# Patient Record
Sex: FEMALE | Race: WHITE | NOT HISPANIC OR LATINO | Employment: UNEMPLOYED | RURAL
[De-identification: names, ages, dates, MRNs, and addresses within clinical notes are randomized per-mention and may not be internally consistent; named-entity substitution may affect disease eponyms.]

---

## 2019-11-27 ENCOUNTER — OFFICE VISIT (OUTPATIENT)
Dept: FAMILY MEDICINE CLINIC | Facility: CLINIC | Age: 24
End: 2019-11-27

## 2019-11-27 VITALS
HEART RATE: 108 BPM | TEMPERATURE: 99 F | OXYGEN SATURATION: 95 % | HEIGHT: 66 IN | BODY MASS INDEX: 20.93 KG/M2 | RESPIRATION RATE: 20 BRPM | SYSTOLIC BLOOD PRESSURE: 100 MMHG | WEIGHT: 130.2 LBS | DIASTOLIC BLOOD PRESSURE: 72 MMHG

## 2019-11-27 DIAGNOSIS — R30.0 DYSURIA: Primary | ICD-10-CM

## 2019-11-27 DIAGNOSIS — R55 SYNCOPE AND COLLAPSE: ICD-10-CM

## 2019-11-27 DIAGNOSIS — F33.0 DEPRESSION, MAJOR, RECURRENT, MILD (HCC): ICD-10-CM

## 2019-11-27 DIAGNOSIS — F43.0 ACUTE REACTION TO STRESS: ICD-10-CM

## 2019-11-27 PROBLEM — R56.9 SEIZURES: Status: ACTIVE | Noted: 2019-11-27

## 2019-11-27 PROBLEM — J01.90 ACUTE SINUSITIS: Status: ACTIVE | Noted: 2019-11-27

## 2019-11-27 PROBLEM — B00.9 RECURRENT HERPES SIMPLEX: Status: ACTIVE | Noted: 2019-11-27

## 2019-11-27 PROBLEM — J06.9 ACUTE UPPER RESPIRATORY INFECTION, UNSPECIFIED: Status: ACTIVE | Noted: 2019-11-27

## 2019-11-27 PROBLEM — G43.909 MIGRAINE: Status: ACTIVE | Noted: 2019-11-27

## 2019-11-27 PROBLEM — N39.0 URINARY TRACT INFECTION: Status: ACTIVE | Noted: 2019-11-27

## 2019-11-27 LAB
BILIRUB BLD-MCNC: NEGATIVE MG/DL
CLARITY, POC: ABNORMAL
COLOR UR: YELLOW
GLUCOSE UR STRIP-MCNC: NEGATIVE MG/DL
KETONES UR QL: NEGATIVE
LEUKOCYTE EST, POC: NEGATIVE
NITRITE UR-MCNC: NEGATIVE MG/ML
PH UR: 5 [PH] (ref 5–8)
PROT UR STRIP-MCNC: NEGATIVE MG/DL
RBC # UR STRIP: ABNORMAL /UL
SP GR UR: 1.01 (ref 1–1.03)
UROBILINOGEN UR QL: NORMAL

## 2019-11-27 PROCEDURE — 99214 OFFICE O/P EST MOD 30 MIN: CPT | Performed by: FAMILY MEDICINE

## 2019-11-27 RX ORDER — SERTRALINE HYDROCHLORIDE 100 MG/1
TABLET, FILM COATED ORAL DAILY
COMMUNITY
Start: 2019-05-21 | End: 2021-08-06

## 2019-11-27 RX ORDER — CEPHALEXIN 500 MG/1
500 CAPSULE ORAL 3 TIMES DAILY
Qty: 30 CAPSULE | Refills: 0 | Status: SHIPPED | OUTPATIENT
Start: 2019-11-27 | End: 2019-12-07

## 2019-11-27 RX ORDER — DULOXETIN HYDROCHLORIDE 30 MG/1
30 CAPSULE, DELAYED RELEASE ORAL DAILY
Refills: 2 | COMMUNITY
Start: 2019-10-31 | End: 2021-08-06

## 2019-11-27 RX ORDER — MEDROXYPROGESTERONE ACETATE 150 MG/ML
INJECTION, SUSPENSION INTRAMUSCULAR SEE ADMIN INSTRUCTIONS
Refills: 0 | COMMUNITY
Start: 2019-10-28 | End: 2021-08-23

## 2019-11-27 RX ORDER — RIZATRIPTAN BENZOATE 10 MG/1
TABLET ORAL
COMMUNITY
Start: 2019-05-17 | End: 2021-08-06

## 2019-11-27 NOTE — PROGRESS NOTES
Subjective   Shahana Peña is a 24 y.o. female.     Chief Complaint   Patient presents with   • Urinary Tract Infection       Urinary Tract Infection    This is a new problem. The current episode started 1 to 4 weeks ago. The problem has been gradually worsening. The quality of the pain is described as burning. The pain is mild. There has been no fever. Associated symptoms include flank pain, frequency, nausea and urgency. Pertinent negatives include no chills or vomiting.            I personally reviewed and updated the patient's allergies, medications, problem list, and past medical, surgical, social, and family history.     Family History   Problem Relation Age of Onset   • Coronary artery disease Maternal Uncle    • Coronary artery disease Paternal Uncle         6 paternal uncles   • Coronary artery disease Maternal Grandmother    • Coronary artery disease Paternal Grandmother    • Coronary artery disease Paternal Grandfather        Social History     Tobacco Use   • Smoking status: Never Smoker   • Smokeless tobacco: Never Used   Substance Use Topics   • Alcohol use: No   • Drug use: No       History reviewed. No pertinent surgical history.    Patient Active Problem List   Diagnosis   • Acute reaction to stress   • Acute sinusitis   • Acute upper respiratory infection, unspecified   • Allergic rhinitis   • Anxiety   • Depression, major, recurrent, mild (CMS/HCC)   • Gastroesophageal reflux   • Migraine   • Dysuria   • Recurrent herpes simplex   • Seizures (CMS/HCC)   • Syncope and collapse   • Underweight   • Urinary tract infection         Current Outpatient Medications:   •  DULoxetine (CYMBALTA) 30 MG capsule, Take 30 mg by mouth Daily., Disp: , Rfl: 2  •  medroxyPROGESTERone Acetate 150 MG/ML suspension prefilled syringe, See Admin Instructions., Disp: , Rfl: 0  •  cephalexin (KEFLEX) 500 MG capsule, Take 1 capsule by mouth 3 (Three) Times a Day for 10 days., Disp: 30 capsule, Rfl: 0  •  rizatriptan  "(MAXALT) 10 MG tablet, Take  by mouth., Disp: , Rfl:   •  sertraline (ZOLOFT) 100 MG tablet, Take  by mouth Daily., Disp: , Rfl:          Review of Systems   Constitutional: Negative for chills and diaphoresis.   Eyes: Negative for visual disturbance.   Respiratory: Negative for shortness of breath.    Cardiovascular: Negative for chest pain and palpitations.   Gastrointestinal: Positive for nausea. Negative for abdominal pain and vomiting.   Endocrine: Negative for polydipsia and polyphagia.   Genitourinary: Positive for flank pain, frequency and urgency.   Musculoskeletal: Negative for neck stiffness.   Skin: Negative for color change and pallor.   Neurological: Negative for seizures and syncope.   Hematological: Negative for adenopathy.   Psychiatric/Behavioral: Negative for hallucinations and suicidal ideas.       Objective   /72   Pulse 108   Temp 99 °F (37.2 °C)   Resp 20   Ht 167.6 cm (66\")   Wt 59.1 kg (130 lb 3.2 oz)   LMP  (LMP Unknown)   SpO2 95%   BMI 21.01 kg/m²   Wt Readings from Last 3 Encounters:   11/27/19 59.1 kg (130 lb 3.2 oz)   05/17/19 56.4 kg (124 lb 6.4 oz)   02/08/19 53.7 kg (118 lb 6 oz)     Physical Exam   Constitutional: She is oriented to person, place, and time. She appears well-developed and well-nourished.   Cardiovascular: Normal rate, regular rhythm, S1 normal, S2 normal, normal heart sounds, intact distal pulses and normal pulses. Exam reveals no gallop and no friction rub.   No murmur heard.  Pulmonary/Chest: Effort normal and breath sounds normal. No accessory muscle usage or stridor. She has no decreased breath sounds. She has no wheezes. She has no rhonchi. She has no rales.   Abdominal: Soft. Normal appearance, normal aorta and bowel sounds are normal. She exhibits no distension, no pulsatile midline mass and no mass. There is no hepatosplenomegaly. There is no tenderness. There is no rigidity, no rebound, no guarding, no CVA tenderness and negative Simental's " sign. No hernia.   Neurological: She is alert and oriented to person, place, and time. She has normal strength and normal reflexes. She displays no tremor. No cranial nerve deficit or sensory deficit. She exhibits normal muscle tone. She displays no seizure activity. Coordination and gait normal.   Skin: Skin is warm and dry. Turgor is normal. She is not diaphoretic. No pallor.         Assessment/Plan       UTI.  Start antibiotics, culture sent.  Call or return if fever persistent symptoms  Depression.  Clinically improved with increased dose Zoloft.  Multiple stressors.  Remote history of inpatient stay/suicide attempt, improved/not suicidal currently.  Good social support.  Follow-up recheck.  Syncope.  Improved, no further episodes.  She did not do EEG or see neurology.  CT brain EKG blood work normal per ED.  Consider further eval if recurrent symptoms.    Problem List Items Addressed This Visit        Cardiovascular and Mediastinum    Syncope and collapse    Overview     improved today  ddx includes seizure, atypical migraine  ct brain, ekg, bloodwork normal per ed  eeg, neurology referrall sched  Call / return if if recurrant symptoms.            Nervous and Auditory    Dysuria - Primary    Relevant Orders    POC Urinalysis Dipstick, Multipro (Completed)    Urine Culture - Urine, Urine, Clean Catch       Other    Acute reaction to stress    Relevant Medications    DULoxetine (CYMBALTA) 30 MG capsule    sertraline (ZOLOFT) 100 MG tablet    Depression, major, recurrent, mild (CMS/HCC)    Overview     worse, increase zoloft   multiple  stressors  remote h/o inpatient stay / suicide attemt, not suicidal currently  Good social support  Follow up recheck  recommend counselling she declines  Call / return if worsening symptoms.         Relevant Medications    DULoxetine (CYMBALTA) 30 MG capsule    sertraline (ZOLOFT) 100 MG tablet              Expected course, medications, and adverse effects discussed.  Call or  return if worsening or persistent symptoms.

## 2019-11-29 LAB
BACTERIA UR CULT: NORMAL
BACTERIA UR CULT: NORMAL

## 2019-12-02 ENCOUNTER — TELEPHONE (OUTPATIENT)
Dept: FAMILY MEDICINE CLINIC | Facility: CLINIC | Age: 24
End: 2019-12-02

## 2019-12-02 NOTE — TELEPHONE ENCOUNTER
Let her know that her urine culture was negative, that she is not improving lets have her come back in to be reevaluated, could possibly be a kidney stone causing her symptoms, thanks

## 2019-12-02 NOTE — TELEPHONE ENCOUNTER
Regarding: Test Results Question  Contact: [phone]  ----- Message from [facility], Generic sent at 12/2/2019 12:18 PM EST -----    What was the results from my test was it just a kidney infection? So far the meds I was given are not helping at all

## 2020-02-13 ENCOUNTER — OFFICE VISIT (OUTPATIENT)
Dept: FAMILY MEDICINE CLINIC | Facility: CLINIC | Age: 25
End: 2020-02-13

## 2020-02-13 VITALS
HEART RATE: 117 BPM | HEIGHT: 66 IN | OXYGEN SATURATION: 98 % | SYSTOLIC BLOOD PRESSURE: 120 MMHG | TEMPERATURE: 98.3 F | DIASTOLIC BLOOD PRESSURE: 84 MMHG | RESPIRATION RATE: 16 BRPM | WEIGHT: 128.8 LBS | BODY MASS INDEX: 20.7 KG/M2

## 2020-02-13 DIAGNOSIS — J10.1 INFLUENZA B: Primary | ICD-10-CM

## 2020-02-13 DIAGNOSIS — J01.00 ACUTE NON-RECURRENT MAXILLARY SINUSITIS: ICD-10-CM

## 2020-02-13 PROBLEM — J01.90 ACUTE SINUSITIS: Status: RESOLVED | Noted: 2019-11-27 | Resolved: 2020-02-13

## 2020-02-13 LAB
EXPIRATION DATE: ABNORMAL
FLUAV AG NPH QL: NEGATIVE
FLUBV AG NPH QL: POSITIVE
INTERNAL CONTROL: ABNORMAL
Lab: ABNORMAL

## 2020-02-13 PROCEDURE — 87804 INFLUENZA ASSAY W/OPTIC: CPT | Performed by: FAMILY MEDICINE

## 2020-02-13 PROCEDURE — 99213 OFFICE O/P EST LOW 20 MIN: CPT | Performed by: FAMILY MEDICINE

## 2020-02-13 RX ORDER — OSELTAMIVIR PHOSPHATE 75 MG/1
75 CAPSULE ORAL 2 TIMES DAILY
Qty: 10 CAPSULE | Refills: 0 | Status: SHIPPED | OUTPATIENT
Start: 2020-02-13 | End: 2020-02-18

## 2020-02-13 RX ORDER — AZITHROMYCIN 250 MG/1
TABLET, FILM COATED ORAL
Qty: 6 TABLET | Refills: 0 | Status: SHIPPED | OUTPATIENT
Start: 2020-02-13 | End: 2021-07-15

## 2020-02-13 NOTE — PROGRESS NOTES
Subjective   Shahana Peña is a 25 y.o. female.     Chief Complaint   Patient presents with   • URI       URI    This is a new problem. The current episode started in the past 7 days. The problem has been unchanged. The maximum temperature recorded prior to her arrival was 101 - 101.9 F. Associated symptoms include congestion, coughing, ear pain, headaches, nausea and sneezing. Pertinent negatives include no abdominal pain, chest pain, diarrhea, dysuria, joint pain, joint swelling, neck pain, plugged ear sensation, rash, rhinorrhea, swollen glands, vomiting or wheezing. She has tried acetaminophen for the symptoms. The treatment provided mild relief.        The following portions of the patient's history were reviewed and updated as appropriate: allergies, current medications, past family history, past medical history, past social history, past surgical history and problem list.    Allergies:  No Known Allergies    Social History:  Social History     Socioeconomic History   • Marital status: Single     Spouse name: Not on file   • Number of children: Not on file   • Years of education: Not on file   • Highest education level: Not on file   Tobacco Use   • Smoking status: Never Smoker   • Smokeless tobacco: Never Used   Substance and Sexual Activity   • Alcohol use: No   • Drug use: No   • Sexual activity: Defer       Family History:  Family History   Problem Relation Age of Onset   • Coronary artery disease Maternal Uncle    • Coronary artery disease Paternal Uncle         6 paternal uncles   • Coronary artery disease Maternal Grandmother    • Coronary artery disease Paternal Grandmother    • Coronary artery disease Paternal Grandfather        Past Medical History :  Patient Active Problem List   Diagnosis   • Acute reaction to stress   • Acute upper respiratory infection, unspecified   • Allergic rhinitis   • Anxiety   • Depression, major, recurrent, mild (CMS/HCC)   • Gastroesophageal reflux   • Migraine   •  Dysuria   • Recurrent herpes simplex   • Seizures (CMS/HCC)   • Syncope and collapse   • Underweight   • Urinary tract infection       Medication List:  Outpatient Encounter Medications as of 2/13/2020   Medication Sig Dispense Refill   • medroxyPROGESTERone Acetate 150 MG/ML suspension prefilled syringe See Admin Instructions.  0   • azithromycin (ZITHROMAX) 250 MG tablet Take 2 tablets the first day, then 1 tablet daily for 4 days. 6 tablet 0   • DULoxetine (CYMBALTA) 30 MG capsule Take 30 mg by mouth Daily.  2   • rizatriptan (MAXALT) 10 MG tablet Take  by mouth.     • sertraline (ZOLOFT) 100 MG tablet Take  by mouth Daily.     • [DISCONTINUED] oseltamivir (TAMIFLU) 75 MG capsule Take 1 capsule by mouth 2 (Two) Times a Day. 10 capsule 0     No facility-administered encounter medications on file as of 2/13/2020.        Past Surgical History:  History reviewed. No pertinent surgical history.    Review of Systems:  Review of Systems   Constitutional: Negative for activity change and fever.   HENT: Positive for congestion, ear pain and sneezing. Negative for rhinorrhea, sinus pressure, swollen glands and voice change.    Eyes: Negative for visual disturbance.   Respiratory: Positive for cough. Negative for shortness of breath and wheezing.    Cardiovascular: Negative for chest pain.   Gastrointestinal: Positive for nausea. Negative for abdominal pain, diarrhea and vomiting.   Endocrine: Negative for cold intolerance and heat intolerance.   Genitourinary: Negative for dysuria, frequency and urgency.   Musculoskeletal: Negative for arthralgias, joint pain and neck pain.   Skin: Negative for rash.   Neurological: Negative for syncope.   Hematological: Does not bruise/bleed easily.   Psychiatric/Behavioral: Negative for depressed mood. The patient is not nervous/anxious.        I have reviewed and confirmed the accuracy of the ROS as documented by the MA/LPN/RN Manju Redding MD    Vital Signs:  Visit Vitals  /84  "  Pulse 117   Temp 98.3 °F (36.8 °C)   Resp 16   Ht 167.6 cm (66\")   Wt 58.4 kg (128 lb 12.8 oz)   LMP  (LMP Unknown)   SpO2 98%   BMI 20.79 kg/m²       Physical Exam   Constitutional: She is oriented to person, place, and time. She appears well-developed and well-nourished. She is cooperative.   HENT:   Head: Normocephalic and atraumatic.   Right Ear: External ear normal. Tympanic membrane is not injected, not erythematous, not retracted and not bulging. No middle ear effusion.   Left Ear: External ear normal. Tympanic membrane is not injected, not erythematous, not retracted and not bulging.  No middle ear effusion.   Nose: Nose normal. No rhinorrhea.   Mouth/Throat: Oropharynx is clear and moist. No oropharyngeal exudate.   Cardiovascular: Normal rate, regular rhythm and normal heart sounds. Exam reveals no gallop and no friction rub.   No murmur heard.  Pulmonary/Chest: Effort normal and breath sounds normal. No respiratory distress. She has no wheezes. She has no rales.   Lymphadenopathy:     She has no cervical adenopathy.   Neurological: She is alert and oriented to person, place, and time. Coordination normal.   Skin: Skin is warm and dry.   Psychiatric: She has a normal mood and affect.   Vitals reviewed.      Assessment and Plan:  Problem List Items Addressed This Visit     None      Visit Diagnoses     Influenza B    -  Primary    Relevant Orders    POC Influenza A / B (Completed)    Acute non-recurrent maxillary sinusitis        Relevant Medications    azithromycin (ZITHROMAX) 250 MG tablet        increase fluids, tylenol for fever, motrin for pain. Humidifier to help with congestion and to sleep at night. Dicussed OTC meds, gargle with warm salt water. If there is recurrent fever, shortness of breath, lethargy, advised to come in to the office or go to the ER.    An After Visit Summary and PPPS were given to the patient.     "

## 2020-02-18 ENCOUNTER — OFFICE VISIT (OUTPATIENT)
Dept: FAMILY MEDICINE CLINIC | Facility: CLINIC | Age: 25
End: 2020-02-18

## 2020-02-18 ENCOUNTER — HOSPITAL ENCOUNTER (OUTPATIENT)
Dept: GENERAL RADIOLOGY | Facility: HOSPITAL | Age: 25
Discharge: HOME | End: 2020-02-18
Admitting: FAMILY MEDICINE

## 2020-02-18 VITALS
SYSTOLIC BLOOD PRESSURE: 116 MMHG | BODY MASS INDEX: 20.41 KG/M2 | OXYGEN SATURATION: 98 % | TEMPERATURE: 98.1 F | WEIGHT: 127 LBS | HEART RATE: 108 BPM | RESPIRATION RATE: 18 BRPM | HEIGHT: 66 IN | DIASTOLIC BLOOD PRESSURE: 62 MMHG

## 2020-02-18 DIAGNOSIS — R04.2 HEMOPTYSIS: ICD-10-CM

## 2020-02-18 DIAGNOSIS — R05.9 COUGH: Primary | ICD-10-CM

## 2020-02-18 DIAGNOSIS — B96.89 ACUTE BACTERIAL BRONCHITIS: ICD-10-CM

## 2020-02-18 DIAGNOSIS — J20.8 ACUTE BACTERIAL BRONCHITIS: ICD-10-CM

## 2020-02-18 PROCEDURE — 99213 OFFICE O/P EST LOW 20 MIN: CPT | Performed by: FAMILY MEDICINE

## 2020-02-18 PROCEDURE — 96372 THER/PROPH/DIAG INJ SC/IM: CPT | Performed by: FAMILY MEDICINE

## 2020-02-18 PROCEDURE — 71046 X-RAY EXAM CHEST 2 VIEWS: CPT

## 2020-02-18 RX ORDER — CEFTRIAXONE SODIUM 250 MG/1
1 INJECTION, POWDER, FOR SOLUTION INTRAMUSCULAR; INTRAVENOUS ONCE
Status: COMPLETED | OUTPATIENT
Start: 2020-02-18 | End: 2020-02-18

## 2020-02-18 RX ADMIN — CEFTRIAXONE SODIUM 1 G: 250 INJECTION, POWDER, FOR SOLUTION INTRAMUSCULAR; INTRAVENOUS at 16:43

## 2020-02-18 NOTE — PROGRESS NOTES
Subjective   Shahana Peña is a 25 y.o. female.     Chief Complaint   Patient presents with   • Cough     with blood       Cough   This is a new problem. The current episode started in the past 7 days. The problem has been gradually worsening. The cough is productive of bloody sputum. Associated symptoms include headaches, nasal congestion and shortness of breath. Pertinent negatives include no chest pain, ear pain, fever, rash or rhinorrhea. Nothing aggravates the symptoms. Treatments tried: Zithromax. The treatment provided mild relief.        The following portions of the patient's history were reviewed and updated as appropriate: allergies, current medications, past family history, past medical history, past social history, past surgical history and problem list.    Allergies:  No Known Allergies    Social History:  Social History     Socioeconomic History   • Marital status: Single     Spouse name: Not on file   • Number of children: Not on file   • Years of education: Not on file   • Highest education level: Not on file   Tobacco Use   • Smoking status: Never Smoker   • Smokeless tobacco: Never Used   Substance and Sexual Activity   • Alcohol use: No   • Drug use: No   • Sexual activity: Defer       Family History:  Family History   Problem Relation Age of Onset   • Coronary artery disease Maternal Uncle    • Coronary artery disease Paternal Uncle         6 paternal uncles   • Coronary artery disease Maternal Grandmother    • Coronary artery disease Paternal Grandmother    • Coronary artery disease Paternal Grandfather        Past Medical History :  Patient Active Problem List   Diagnosis   • Acute reaction to stress   • Acute upper respiratory infection, unspecified   • Allergic rhinitis   • Anxiety   • Depression, major, recurrent, mild (CMS/HCC)   • Gastroesophageal reflux   • Migraine   • Dysuria   • Recurrent herpes simplex   • Seizures (CMS/HCC)   • Syncope and collapse   • Underweight   • Urinary tract  "infection       Medication List:    Current Outpatient Medications:   •  azithromycin (ZITHROMAX) 250 MG tablet, Take 2 tablets the first day, then 1 tablet daily for 4 days., Disp: 6 tablet, Rfl: 0  •  DULoxetine (CYMBALTA) 30 MG capsule, Take 30 mg by mouth Daily., Disp: , Rfl: 2  •  medroxyPROGESTERone Acetate 150 MG/ML suspension prefilled syringe, See Admin Instructions., Disp: , Rfl: 0  •  rizatriptan (MAXALT) 10 MG tablet, Take  by mouth., Disp: , Rfl:   •  sertraline (ZOLOFT) 100 MG tablet, Take  by mouth Daily., Disp: , Rfl:     Past Surgical History:  History reviewed. No pertinent surgical history.    Review of Systems:  Review of Systems   Constitutional: Negative for activity change and fever.   HENT: Negative for ear pain, rhinorrhea, sinus pressure and voice change.    Eyes: Negative for visual disturbance.   Respiratory: Positive for cough and shortness of breath.    Cardiovascular: Negative for chest pain.   Gastrointestinal: Negative for abdominal pain, diarrhea, nausea and vomiting.   Endocrine: Negative for cold intolerance and heat intolerance.   Genitourinary: Negative for frequency and urgency.   Musculoskeletal: Negative for arthralgias.   Skin: Negative for rash.   Neurological: Negative for syncope.   Hematological: Does not bruise/bleed easily.   Psychiatric/Behavioral: Negative for depressed mood. The patient is not nervous/anxious.        Physical Exam:  Vital Signs:  Visit Vitals  /62 (BP Location: Right arm, Patient Position: Sitting, Cuff Size: Large Adult)   Pulse 108   Temp 98.1 °F (36.7 °C) (Oral)   Resp 18   Ht 167.6 cm (66\")   Wt 57.6 kg (127 lb)   LMP  (LMP Unknown)   SpO2 98%   BMI 20.50 kg/m²       Physical Exam   Constitutional: She is oriented to person, place, and time. She appears well-developed and well-nourished. She is cooperative.   HENT:   Head: Normocephalic and atraumatic.   Right Ear: External ear normal. Tympanic membrane is not injected, not erythematous, " not retracted and not bulging. No middle ear effusion.   Left Ear: External ear normal. Tympanic membrane is not injected, not erythematous, not retracted and not bulging.  No middle ear effusion.   Nose: Nose normal. No rhinorrhea.   Mouth/Throat: Oropharynx is clear and moist. No oropharyngeal exudate.   Cardiovascular: Normal rate, regular rhythm and normal heart sounds. Exam reveals no gallop and no friction rub.   No murmur heard.  Pulmonary/Chest: Effort normal and breath sounds normal. No respiratory distress. She has no wheezes. She has no rales.   Lymphadenopathy:     She has no cervical adenopathy.   Neurological: She is alert and oriented to person, place, and time. Coordination normal.   Skin: Skin is warm and dry.   Psychiatric: She has a normal mood and affect.   Vitals reviewed.      Assessment and Plan:  Problem List Items Addressed This Visit     None      Visit Diagnoses     Cough    -  Primary    worse  Will get xray    Relevant Medications    cefTRIAXone (ROCEPHIN) injection 1 g (Completed)    Other Relevant Orders    XR Chest 2 View (Completed)    Hemoptysis        May be from coughing too hard. Will get chest xray    Relevant Medications    cefTRIAXone (ROCEPHIN) injection 1 g (Completed)    Acute bacterial bronchitis            increase fluids, tylenol for fever, motrin for pain. Humidifier to help with congestion and to sleep at night. Dicussed OTC meds, gargle with warm salt water. If there is recurrent fever, shortness of breath, lethargy, advised to come in to the office or go to the ER.      An After Visit Summary and PPPS were given to the patient.

## 2020-10-28 ENCOUNTER — OFFICE VISIT (OUTPATIENT)
Dept: FAMILY MEDICINE CLINIC | Facility: CLINIC | Age: 25
End: 2020-10-28

## 2020-10-28 VITALS
BODY MASS INDEX: 20.12 KG/M2 | SYSTOLIC BLOOD PRESSURE: 93 MMHG | HEART RATE: 78 BPM | TEMPERATURE: 97.5 F | DIASTOLIC BLOOD PRESSURE: 65 MMHG | HEIGHT: 66 IN | WEIGHT: 125.2 LBS

## 2020-10-28 DIAGNOSIS — J06.9 ACUTE UPPER RESPIRATORY INFECTION, UNSPECIFIED: Primary | ICD-10-CM

## 2020-10-28 PROCEDURE — 99213 OFFICE O/P EST LOW 20 MIN: CPT | Performed by: FAMILY MEDICINE

## 2020-10-28 RX ORDER — AZITHROMYCIN 250 MG/1
TABLET, FILM COATED ORAL
Qty: 6 TABLET | Refills: 0 | Status: SHIPPED | OUTPATIENT
Start: 2020-10-28 | End: 2021-01-05

## 2021-01-04 NOTE — PROGRESS NOTES
Subjective   Shahana Peña is a 25 y.o. female.     Chief Complaint   Patient presents with   • Urinary Tract Infection       Urinary Tract Infection   This is a new problem. The current episode started 1 to 4 weeks ago. The problem has been gradually worsening. The quality of the pain is described as aching and burning. The pain is at a severity of 5/10. The pain is moderate. There has been no fever. Associated symptoms include a discharge (AZO) and nausea. Pertinent negatives include no chills, frequency, hematuria, urgency or vomiting. She has tried nothing for the symptoms. The treatment provided mild relief.            I personally reviewed and updated the patient's allergies, medications, problem list, and past medical, surgical, social, and family history. I have reviewed and confirmed the accuracy of the History of Present Illness and Review of Symptoms as documented by the MA/LPN/RN. Juan Hernandes MD    Family History   Problem Relation Age of Onset   • Coronary artery disease Maternal Uncle    • Coronary artery disease Paternal Uncle         6 paternal uncles   • Coronary artery disease Maternal Grandmother    • Coronary artery disease Paternal Grandmother    • Coronary artery disease Paternal Grandfather        Social History     Tobacco Use   • Smoking status: Never Smoker   • Smokeless tobacco: Never Used   Substance Use Topics   • Alcohol use: No   • Drug use: No       History reviewed. No pertinent surgical history.    Patient Active Problem List   Diagnosis   • Acute reaction to stress   • Allergic rhinitis   • Anxiety   • Depression, major, recurrent, mild (CMS/HCC)   • Gastroesophageal reflux   • Migraine   • Dysuria   • Recurrent herpes simplex   • Seizures (CMS/HCC)   • Underweight   • Urinary tract infection         Current Outpatient Medications:   •  acyclovir (ZOVIRAX) 800 MG tablet, TAKE 1 TABLET BY MOUTH TWICE DAILY FOR 5 DAYS THEN AS NEEDED, Disp: , Rfl:   •  DULoxetine (CYMBALTA) 30 MG  "capsule, Take 30 mg by mouth Daily., Disp: , Rfl: 2  •  medroxyPROGESTERone Acetate 150 MG/ML suspension prefilled syringe, See Admin Instructions., Disp: , Rfl: 0  •  rizatriptan (MAXALT) 10 MG tablet, Take  by mouth., Disp: , Rfl:   •  sertraline (ZOLOFT) 100 MG tablet, Take  by mouth Daily., Disp: , Rfl:   •  azithromycin (ZITHROMAX) 250 MG tablet, Take 2 tablets the first day, then 1 tablet daily for 4 days., Disp: 6 tablet, Rfl: 0  •  omeprazole (priLOSEC) 40 MG capsule, Take 1 capsule by mouth Daily., Disp: 30 capsule, Rfl: 5  •  sulfamethoxazole-trimethoprim (BACTRIM DS,SEPTRA DS) 800-160 MG per tablet, Take 1 tablet by mouth 2 (Two) Times a Day for 10 days., Disp: 20 tablet, Rfl: 0         Review of Systems   Constitutional: Negative for chills and diaphoresis.   HENT: Negative for trouble swallowing and voice change.    Eyes: Negative for visual disturbance.   Respiratory: Negative for shortness of breath.    Cardiovascular: Negative for chest pain and palpitations.   Gastrointestinal: Positive for nausea. Negative for abdominal pain and vomiting.   Endocrine: Negative for polydipsia and polyphagia.   Genitourinary: Negative for frequency, hematuria and urgency.   Musculoskeletal: Negative for neck stiffness.   Skin: Negative for color change and pallor.   Allergic/Immunologic: Negative for immunocompromised state.   Neurological: Negative for seizures and syncope.   Hematological: Negative for adenopathy.   Psychiatric/Behavioral: Negative for hallucinations, sleep disturbance and suicidal ideas.       I have reviewed and confirmed the accuracy of the ROS as documented by the MA/LPN/RN Juan Hernandes MD      Objective   /80 (BP Location: Right arm, Patient Position: Sitting, Cuff Size: Adult)   Pulse 83   Temp 97.6 °F (36.4 °C)   Resp 18   Ht 162.6 cm (64\")   Wt 62.1 kg (136 lb 12.8 oz)   SpO2 99%   BMI 23.48 kg/m²   BP Readings from Last 3 Encounters:   01/05/21 104/80   10/28/20 93/65 "   02/18/20 116/62     Wt Readings from Last 3 Encounters:   01/05/21 62.1 kg (136 lb 12.8 oz)   10/28/20 56.8 kg (125 lb 3.2 oz)   02/18/20 57.6 kg (127 lb)     Physical Exam  Constitutional:       Appearance: Normal appearance. She is well-developed. She is not diaphoretic.   Cardiovascular:      Rate and Rhythm: Normal rate and regular rhythm.      Pulses: Normal pulses.      Heart sounds: Normal heart sounds, S1 normal and S2 normal. No murmur. No friction rub. No gallop.    Pulmonary:      Effort: Pulmonary effort is normal. No accessory muscle usage.      Breath sounds: Normal breath sounds. No stridor. No decreased breath sounds, wheezing, rhonchi or rales.   Abdominal:      General: Bowel sounds are normal. There is no distension.      Palpations: Abdomen is soft. Abdomen is not rigid. There is no mass or pulsatile mass.      Tenderness: There is no abdominal tenderness. There is no guarding or rebound. Negative signs include Simental's sign.      Hernia: No hernia is present.   Skin:     General: Skin is warm and dry.      Coloration: Skin is not pale.   Neurological:      Mental Status: She is alert and oriented to person, place, and time.      Coordination: Coordination normal.      Gait: Gait normal.         Data / Lab Results:    No results found for: HGBA1C     No results found for: LDL, LDLDIRECT  No results found for: CHOL  No results found for: TRIG  No results found for: HDL  No results found for: PSA  No results found for: WBC, HGB, HCT, MCV, PLT  No results found for: TSH, S1ODNMY, R4KGZAC   No results found for: GLUCOSE, BUN, CREATININE, EGFRIFNONA, EGFRIFAFRI, BCR, K, CO2, CALCIUM, PROTENTOTREF, ALBUMIN, LABIL2, BILIRUBIN, AST, ALT  No results found for: BLANKA, RF, SEDRATE   No results found for: CRP   No results found for: IRON, TIBC, FERRITIN   No results found for: AYVPRSFN14       Assessment/Plan      Medications        Problem List         LOS    UTI.  UA with signs of infection.  Start  antibiotics, culture sent.  Call return if fever worsening symptoms.  GERD.  Start PPI.  Discussed diet, lifestyle modification.  DDX includes gallbladder pathology, consider right upper quadrant ultrasound if persistent symptoms.  Depression.  Clinically improved currently, off Rx by 1 year.  Call return if recurrent symptoms.           Diagnoses and all orders for this visit:    1. Urinary tract infection without hematuria, site unspecified (Primary)  -     POCT urinalysis dipstick, manual  -     Urine Culture - Urine, Urine, Clean Catch  -     sulfamethoxazole-trimethoprim (BACTRIM DS,SEPTRA DS) 800-160 MG per tablet; Take 1 tablet by mouth 2 (Two) Times a Day for 10 days.  Dispense: 20 tablet; Refill: 0    2. Dysuria    3. Gastroesophageal reflux disease without esophagitis  -     omeprazole (priLOSEC) 40 MG capsule; Take 1 capsule by mouth Daily.  Dispense: 30 capsule; Refill: 5    4. Acute reaction to stress              Expected course, medications, and adverse effects discussed.  Call or return if worsening or persistent symptoms.  I wore protective equipment throughout this patient encounter including a mask, gloves, and eye protection.  Hand hygiene was performed before donning protective equipment and after removal when leaving the room. The complete contents of the Assessment and Plan and Data/Lab Results as documented above have been reviewed and addressed by myself with the patient today as part of an ongoing evaluation / treatment plan.  If some of the documentation has been copied from a previous note and is unchanged it indicates that this problem / plan has been assessed today but is stable from a previous visit and no changes have been recommended.

## 2021-01-05 ENCOUNTER — TELEPHONE (OUTPATIENT)
Dept: FAMILY MEDICINE CLINIC | Facility: CLINIC | Age: 26
End: 2021-01-05

## 2021-01-05 ENCOUNTER — OFFICE VISIT (OUTPATIENT)
Dept: FAMILY MEDICINE CLINIC | Facility: CLINIC | Age: 26
End: 2021-01-05

## 2021-01-05 VITALS
RESPIRATION RATE: 18 BRPM | HEIGHT: 64 IN | WEIGHT: 136.8 LBS | SYSTOLIC BLOOD PRESSURE: 104 MMHG | OXYGEN SATURATION: 99 % | BODY MASS INDEX: 23.35 KG/M2 | TEMPERATURE: 97.6 F | DIASTOLIC BLOOD PRESSURE: 80 MMHG | HEART RATE: 83 BPM

## 2021-01-05 DIAGNOSIS — F43.0 ACUTE REACTION TO STRESS: ICD-10-CM

## 2021-01-05 DIAGNOSIS — K21.9 GASTROESOPHAGEAL REFLUX DISEASE WITHOUT ESOPHAGITIS: ICD-10-CM

## 2021-01-05 DIAGNOSIS — N39.0 URINARY TRACT INFECTION WITHOUT HEMATURIA, SITE UNSPECIFIED: Primary | ICD-10-CM

## 2021-01-05 DIAGNOSIS — R30.0 DYSURIA: ICD-10-CM

## 2021-01-05 PROBLEM — R55 SYNCOPE AND COLLAPSE: Status: RESOLVED | Noted: 2019-11-27 | Resolved: 2021-01-05

## 2021-01-05 PROBLEM — J06.9 ACUTE UPPER RESPIRATORY INFECTION, UNSPECIFIED: Status: RESOLVED | Noted: 2019-11-27 | Resolved: 2021-01-05

## 2021-01-05 LAB
BILIRUB BLD-MCNC: NEGATIVE MG/DL
CLARITY, POC: ABNORMAL
COLOR UR: YELLOW
GLUCOSE UR STRIP-MCNC: NEGATIVE MG/DL
KETONES UR QL: ABNORMAL
LEUKOCYTE EST, POC: ABNORMAL
NITRITE UR-MCNC: NEGATIVE MG/ML
PH UR: 6 [PH] (ref 5–8)
PROT UR STRIP-MCNC: NEGATIVE MG/DL
RBC # UR STRIP: ABNORMAL /UL
SP GR UR: 1.03 (ref 1–1.03)
UROBILINOGEN UR QL: NORMAL

## 2021-01-05 PROCEDURE — 99214 OFFICE O/P EST MOD 30 MIN: CPT | Performed by: FAMILY MEDICINE

## 2021-01-05 RX ORDER — ACYCLOVIR 800 MG/1
TABLET ORAL
COMMUNITY
Start: 2020-12-29 | End: 2021-08-06

## 2021-01-05 RX ORDER — OMEPRAZOLE 40 MG/1
40 CAPSULE, DELAYED RELEASE ORAL DAILY
Qty: 30 CAPSULE | Refills: 5 | Status: SHIPPED | OUTPATIENT
Start: 2021-01-05 | End: 2021-04-06 | Stop reason: DRUGHIGH

## 2021-01-05 RX ORDER — SULFAMETHOXAZOLE AND TRIMETHOPRIM 800; 160 MG/1; MG/1
1 TABLET ORAL 2 TIMES DAILY
Qty: 20 TABLET | Refills: 0 | Status: SHIPPED | OUTPATIENT
Start: 2021-01-05 | End: 2021-01-15

## 2021-01-07 LAB
BACTERIA UR CULT: NORMAL
BACTERIA UR CULT: NORMAL

## 2021-04-02 DIAGNOSIS — K21.9 GASTROESOPHAGEAL REFLUX DISEASE WITHOUT ESOPHAGITIS: Primary | ICD-10-CM

## 2021-04-02 NOTE — TELEPHONE ENCOUNTER
Insurance states that they will not cover omeprazole 40mg in capsule form however they will cover 20 mg tablets.  Please advise how you'd like to proceed

## 2021-04-06 RX ORDER — OMEPRAZOLE 20 MG/1
20 CAPSULE, DELAYED RELEASE ORAL DAILY
Qty: 90 CAPSULE | Refills: 3 | Status: SHIPPED | OUTPATIENT
Start: 2021-04-06 | End: 2021-06-02 | Stop reason: SDUPTHER

## 2021-06-02 DIAGNOSIS — K21.9 GASTROESOPHAGEAL REFLUX DISEASE WITHOUT ESOPHAGITIS: ICD-10-CM

## 2021-06-03 RX ORDER — OMEPRAZOLE 20 MG/1
20 CAPSULE, DELAYED RELEASE ORAL DAILY
Qty: 90 CAPSULE | Refills: 3 | Status: SHIPPED | OUTPATIENT
Start: 2021-06-03 | End: 2021-08-23

## 2021-07-13 ENCOUNTER — TELEPHONE (OUTPATIENT)
Dept: FAMILY MEDICINE CLINIC | Facility: CLINIC | Age: 26
End: 2021-07-13

## 2021-07-15 ENCOUNTER — OFFICE VISIT (OUTPATIENT)
Dept: FAMILY MEDICINE CLINIC | Facility: CLINIC | Age: 26
End: 2021-07-15

## 2021-07-15 VITALS
SYSTOLIC BLOOD PRESSURE: 98 MMHG | HEART RATE: 99 BPM | OXYGEN SATURATION: 99 % | BODY MASS INDEX: 21.31 KG/M2 | RESPIRATION RATE: 16 BRPM | TEMPERATURE: 99 F | WEIGHT: 124.8 LBS | DIASTOLIC BLOOD PRESSURE: 60 MMHG | HEIGHT: 64 IN

## 2021-07-15 DIAGNOSIS — S62.356A CLOSED NONDISPLACED FRACTURE OF SHAFT OF FIFTH METACARPAL BONE OF RIGHT HAND, INITIAL ENCOUNTER: Primary | ICD-10-CM

## 2021-07-15 PROBLEM — F43.0 ACUTE REACTION TO STRESS: Status: RESOLVED | Noted: 2019-11-27 | Resolved: 2021-07-15

## 2021-07-15 PROBLEM — N39.0 URINARY TRACT INFECTION: Status: RESOLVED | Noted: 2019-11-27 | Resolved: 2021-07-15

## 2021-07-15 PROBLEM — R30.0 DYSURIA: Status: RESOLVED | Noted: 2019-11-27 | Resolved: 2021-07-15

## 2021-07-15 PROCEDURE — 99213 OFFICE O/P EST LOW 20 MIN: CPT | Performed by: FAMILY MEDICINE
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
## 2021-08-06 ENCOUNTER — OFFICE VISIT (OUTPATIENT)
Dept: FAMILY MEDICINE CLINIC | Facility: CLINIC | Age: 26
End: 2021-08-06

## 2021-08-06 VITALS
WEIGHT: 122 LBS | SYSTOLIC BLOOD PRESSURE: 117 MMHG | BODY MASS INDEX: 20.83 KG/M2 | TEMPERATURE: 98.6 F | DIASTOLIC BLOOD PRESSURE: 82 MMHG | HEART RATE: 113 BPM | HEIGHT: 64 IN | OXYGEN SATURATION: 97 % | RESPIRATION RATE: 18 BRPM

## 2021-08-06 DIAGNOSIS — Z20.822 SUSPECTED COVID-19 VIRUS INFECTION: Primary | ICD-10-CM

## 2021-08-06 DIAGNOSIS — J01.00 ACUTE NON-RECURRENT MAXILLARY SINUSITIS: ICD-10-CM

## 2021-08-06 DIAGNOSIS — H61.22 IMPACTED CERUMEN OF LEFT EAR: ICD-10-CM

## 2021-08-06 PROCEDURE — 99213 OFFICE O/P EST LOW 20 MIN: CPT | Performed by: FAMILY MEDICINE

## 2021-08-06 RX ORDER — AZITHROMYCIN 250 MG/1
TABLET, FILM COATED ORAL
Qty: 6 TABLET | Refills: 0 | Status: SHIPPED | OUTPATIENT
Start: 2021-08-06 | End: 2021-08-23

## 2021-08-06 NOTE — ASSESSMENT & PLAN NOTE
Will check labs  increase fluids, tylenol for fever. Humidifier to help with congestion and to sleep at night. Dicussed OTC meds, gargle with warm salt water. If there is recurrent fever, shortness of breath, lethargy, advised to come in to the office or go to the ER.

## 2021-08-06 NOTE — PROGRESS NOTES
Subjective   Shahana Peña is a 26 y.o. female.     Chief Complaint   Patient presents with   • URI       URI   This is a new problem. The current episode started yesterday. The problem has been gradually worsening. The maximum temperature recorded prior to her arrival was 101 - 101.9 F. The fever has been present for 1 to 2 days. Associated symptoms include congestion, coughing, headaches, nausea, a sore throat and vomiting. Pertinent negatives include no abdominal pain, chest pain, diarrhea, ear pain, plugged ear sensation, rash, rhinorrhea, sinus pain, sneezing or wheezing. Associated symptoms comments: SOA, fever, headache and body aches  . Treatments tried: motrin and tylenol. The treatment provided mild relief.    Feeling achy and nauseated also. She can still smell and taste.     I personally reviewed and updated the patient's allergies, medications, problem list, and past medical, surgical, social, and family history. I have reviewed and confirmed the accuracy of the History of Present Illness and Review of Symptoms as documented by the MA/LPN/RN. Manju Redding MD    Allergies:  No Known Allergies    Social History:  Social History     Socioeconomic History   • Marital status: Single     Spouse name: Not on file   • Number of children: Not on file   • Years of education: Not on file   • Highest education level: Not on file   Tobacco Use   • Smoking status: Never Smoker   • Smokeless tobacco: Never Used   Substance and Sexual Activity   • Alcohol use: No   • Drug use: No   • Sexual activity: Defer       Family History:  Family History   Problem Relation Age of Onset   • Coronary artery disease Maternal Uncle    • Coronary artery disease Paternal Uncle         6 paternal uncles   • Coronary artery disease Maternal Grandmother    • Coronary artery disease Paternal Grandmother    • Coronary artery disease Paternal Grandfather        Past Medical History :  Patient Active Problem List   Diagnosis   • Allergic  "rhinitis   • Anxiety   • Depression, major, recurrent, mild (CMS/HCC)   • Gastroesophageal reflux   • Migraine   • Recurrent herpes simplex   • Seizures (CMS/HCC)   • Suspected COVID-19 virus infection   • Acute non-recurrent maxillary sinusitis   • Impacted cerumen of left ear       Medication List:    Current Outpatient Medications:   •  omeprazole (priLOSEC) 20 MG capsule, Take 1 capsule by mouth Daily., Disp: 90 capsule, Rfl: 3  •  azithromycin (ZITHROMAX) 250 MG tablet, Take 2 tablets the first day, then 1 tablet daily for 4 days., Disp: 6 tablet, Rfl: 0  •  medroxyPROGESTERone Acetate 150 MG/ML suspension prefilled syringe, See Admin Instructions., Disp: , Rfl: 0  •  promethazine (PHENERGAN) 25 MG tablet, Take 1 tablet by mouth Every 6 (Six) Hours As Needed for Nausea or Vomiting., Disp: 20 tablet, Rfl: 0    Past Surgical History:  No past surgical history on file.    Review of Systems:  Review of Systems   Constitutional: Negative for activity change and fever.   HENT: Positive for congestion and sore throat. Negative for ear pain, rhinorrhea, sinus pressure, sneezing and voice change.    Eyes: Negative for visual disturbance.   Respiratory: Positive for cough. Negative for shortness of breath and wheezing.    Cardiovascular: Negative for chest pain.   Gastrointestinal: Positive for nausea and vomiting. Negative for abdominal pain and diarrhea.   Endocrine: Negative for cold intolerance and heat intolerance.   Genitourinary: Negative for frequency and urgency.   Musculoskeletal: Negative for arthralgias.   Skin: Negative for rash.   Neurological: Negative for syncope.   Hematological: Does not bruise/bleed easily.   Psychiatric/Behavioral: Negative for depressed mood. The patient is not nervous/anxious.        Physical Exam:  Vital Signs:  Vital Signs:   /82   Pulse 113   Temp 98.6 °F (37 °C)   Resp 18   Ht 162.6 cm (64\")   Wt 55.3 kg (122 lb)   SpO2 97%   BMI 20.94 kg/m²     Result Review : "                Physical Exam  Vitals reviewed.   Constitutional:       Appearance: Normal appearance. She is well-developed.   HENT:      Head: Normocephalic and atraumatic.      Right Ear: External ear normal. No decreased hearing noted. No tenderness. No middle ear effusion. Tympanic membrane is not injected, erythematous, retracted or bulging.      Left Ear: External ear normal. No decreased hearing noted. No tenderness.  No middle ear effusion. Tympanic membrane is not injected, erythematous, retracted or bulging.      Nose: Nose normal. No rhinorrhea.      Mouth/Throat:      Pharynx: No oropharyngeal exudate or posterior oropharyngeal erythema.   Eyes:      General:         Right eye: No discharge.         Left eye: No discharge.   Cardiovascular:      Rate and Rhythm: Normal rate and regular rhythm.      Heart sounds: Normal heart sounds. No murmur heard.   No friction rub. No gallop.    Pulmonary:      Effort: Pulmonary effort is normal. No respiratory distress.      Breath sounds: Normal breath sounds. No wheezing or rales.   Lymphadenopathy:      Cervical: No cervical adenopathy.   Skin:     General: Skin is warm and dry.      Findings: No rash.   Neurological:      Mental Status: She is alert and oriented to person, place, and time.      Coordination: Coordination normal.      Gait: Gait normal.   Psychiatric:         Behavior: Behavior is cooperative.         Assessment and Plan:  Problems Addressed this Visit        ENT    Acute non-recurrent maxillary sinusitis     increase fluids, tylenol for fever.. Humidifier to help with congestion and to sleep at night. Dicussed OTC meds, gargle with warm salt water. If there is recurrent fever, shortness of breath, lethargy, advised to come in to the office or go to the ER.           Relevant Medications    azithromycin (ZITHROMAX) 250 MG tablet    Impacted cerumen of left ear     Discussed home care            Other    Suspected COVID-19 virus infection - Primary      Will check labs  increase fluids, tylenol for fever. Humidifier to help with congestion and to sleep at night. Dicussed OTC meds, gargle with warm salt water. If there is recurrent fever, shortness of breath, lethargy, advised to come in to the office or go to the ER.           Relevant Orders    COVID-19,[facility] ROUTINE, NP/OP SWAB IN TRANSPORT MEDIA OR ESWAB 72 HR TAT - Swab, Nasopharynx (Completed)      Diagnoses       Codes Comments    Suspected COVID-19 virus infection    -  Primary ICD-10-CM: Z20.822  ICD-9-CM: V01.79     Acute non-recurrent maxillary sinusitis     ICD-10-CM: J01.00  ICD-9-CM: 461.0     Impacted cerumen of left ear     ICD-10-CM: H61.22  ICD-9-CM: 380.4            An After Visit Summary and PPPS were given to the patient.         I wore protective equipment throughout this patient encounter to include mask. Hand hygiene was performed before donning protective equipment and after removal when leaving the room.

## 2021-08-06 NOTE — ASSESSMENT & PLAN NOTE
increase fluids, tylenol for fever.. Humidifier to help with congestion and to sleep at night. Dicussed OTC meds, gargle with warm salt water. If there is recurrent fever, shortness of breath, lethargy, advised to come in to the office or go to the ER.

## 2021-08-07 LAB
LABCORP SARS-COV-2, NAA 2 DAY TAT: NORMAL
SARS-COV-2 RNA RESP QL NAA+PROBE: DETECTED

## 2021-08-09 ENCOUNTER — TELEPHONE (OUTPATIENT)
Dept: FAMILY MEDICINE CLINIC | Facility: CLINIC | Age: 26
End: 2021-08-09

## 2021-08-09 DIAGNOSIS — U07.1 COVID-19 VIRUS DETECTED: Primary | ICD-10-CM

## 2021-08-09 RX ORDER — PROMETHAZINE HYDROCHLORIDE 25 MG/1
25 TABLET ORAL EVERY 6 HOURS PRN
Qty: 20 TABLET | Refills: 0 | Status: SHIPPED | OUTPATIENT
Start: 2021-08-09 | End: 2021-08-23

## 2021-08-09 NOTE — TELEPHONE ENCOUNTER
----- Message from Manju Redding MD sent at 8/8/2021  7:45 PM EDT -----  She is positive for covid

## 2021-08-10 ENCOUNTER — TELEPHONE (OUTPATIENT)
Dept: FAMILY MEDICINE CLINIC | Facility: CLINIC | Age: 26
End: 2021-08-10

## 2021-08-10 NOTE — TELEPHONE ENCOUNTER
Let her know her test does not differentiate which drain, however, it is most likely the delta variant considering this has been increasing recently

## 2021-08-10 NOTE — TELEPHONE ENCOUNTER
Caller: XI KNAPP    Relationship: Mother    Best call back number:[phone]   What is the best time to reach you: ANYTIME  Who are you requesting to speak with (clinical staff, provider,  specific staff member): CLINICAL    Do you know the name of the person who called: XI     What was the call regarding: WANTS TO KNOW WHICH STRAIN OF COVID THAT HER DAUGHTER HAS.     Do you require a callback: YES

## 2021-08-23 ENCOUNTER — OFFICE VISIT (OUTPATIENT)
Dept: FAMILY MEDICINE CLINIC | Facility: CLINIC | Age: 26
End: 2021-08-23

## 2021-08-23 ENCOUNTER — HOSPITAL ENCOUNTER (OUTPATIENT)
Dept: GENERAL RADIOLOGY | Facility: HOSPITAL | Age: 26
Discharge: HOME | End: 2021-08-23
Admitting: FAMILY MEDICINE

## 2021-08-23 VITALS
TEMPERATURE: 97.9 F | RESPIRATION RATE: 16 BRPM | HEART RATE: 78 BPM | OXYGEN SATURATION: 98 % | DIASTOLIC BLOOD PRESSURE: 68 MMHG | SYSTOLIC BLOOD PRESSURE: 102 MMHG

## 2021-08-23 DIAGNOSIS — R05.9 COUGH: ICD-10-CM

## 2021-08-23 DIAGNOSIS — U07.1 COVID-19 VIRUS DETECTED: Primary | ICD-10-CM

## 2021-08-23 PROCEDURE — 99214 OFFICE O/P EST MOD 30 MIN: CPT | Performed by: FAMILY MEDICINE

## 2021-08-23 PROCEDURE — 71046 X-RAY EXAM CHEST 2 VIEWS: CPT

## 2021-08-23 RX ORDER — PREDNISONE 10 MG/1
10 TABLET ORAL TAKE AS DIRECTED
Qty: 35 TABLET | Refills: 0 | Status: SHIPPED | OUTPATIENT
Start: 2021-08-23 | End: 2021-09-07

## 2021-08-23 RX ORDER — BENZONATATE 100 MG/1
100 CAPSULE ORAL 3 TIMES DAILY PRN
Qty: 30 CAPSULE | Refills: 1 | Status: SHIPPED | OUTPATIENT
Start: 2021-08-23 | End: 2021-10-18

## 2021-08-24 ENCOUNTER — TELEPHONE (OUTPATIENT)
Dept: FAMILY MEDICINE CLINIC | Facility: CLINIC | Age: 26
End: 2021-08-24

## 2021-08-24 LAB
ALBUMIN SERPL-MCNC: 4.8 G/DL (ref 3.9–5)
ALBUMIN/GLOB SERPL: 2.5 {RATIO} (ref 1.2–2.2)
ALP SERPL-CCNC: 77 IU/L (ref 48–121)
ALT SERPL-CCNC: 14 IU/L (ref 0–32)
AST SERPL-CCNC: 15 IU/L (ref 0–40)
BASOPHILS # BLD AUTO: 0 X10E3/UL (ref 0–0.2)
BASOPHILS NFR BLD AUTO: 1 %
BILIRUB SERPL-MCNC: 0.5 MG/DL (ref 0–1.2)
BUN SERPL-MCNC: 9 MG/DL (ref 6–20)
BUN/CREAT SERPL: 12 (ref 9–23)
CALCIUM SERPL-MCNC: 9.5 MG/DL (ref 8.7–10.2)
CHLORIDE SERPL-SCNC: 106 MMOL/L (ref 96–106)
CO2 SERPL-SCNC: 24 MMOL/L (ref 20–29)
CREAT SERPL-MCNC: 0.73 MG/DL (ref 0.57–1)
EOSINOPHIL # BLD AUTO: 0.1 X10E3/UL (ref 0–0.4)
EOSINOPHIL NFR BLD AUTO: 1 %
ERYTHROCYTE [DISTWIDTH] IN BLOOD BY AUTOMATED COUNT: 13.1 % (ref 11.7–15.4)
GLOBULIN SER CALC-MCNC: 1.9 G/DL (ref 1.5–4.5)
GLUCOSE SERPL-MCNC: 84 MG/DL (ref 65–99)
HCT VFR BLD AUTO: 38.3 % (ref 34–46.6)
HGB BLD-MCNC: 12.4 G/DL (ref 11.1–15.9)
IMM GRANULOCYTES # BLD AUTO: 0 X10E3/UL (ref 0–0.1)
IMM GRANULOCYTES NFR BLD AUTO: 0 %
LYMPHOCYTES # BLD AUTO: 2.3 X10E3/UL (ref 0.7–3.1)
LYMPHOCYTES NFR BLD AUTO: 39 %
MCH RBC QN AUTO: 29.3 PG (ref 26.6–33)
MCHC RBC AUTO-ENTMCNC: 32.4 G/DL (ref 31.5–35.7)
MCV RBC AUTO: 91 FL (ref 79–97)
MONOCYTES # BLD AUTO: 0.4 X10E3/UL (ref 0.1–0.9)
MONOCYTES NFR BLD AUTO: 7 %
NEUTROPHILS # BLD AUTO: 3.1 X10E3/UL (ref 1.4–7)
NEUTROPHILS NFR BLD AUTO: 52 %
PLATELET # BLD AUTO: 213 X10E3/UL (ref 150–450)
POTASSIUM SERPL-SCNC: 4 MMOL/L (ref 3.5–5.2)
PROT SERPL-MCNC: 6.7 G/DL (ref 6–8.5)
RBC # BLD AUTO: 4.23 X10E6/UL (ref 3.77–5.28)
SODIUM SERPL-SCNC: 143 MMOL/L (ref 134–144)
WBC # BLD AUTO: 6 X10E3/UL (ref 3.4–10.8)

## 2021-09-04 PROBLEM — R05.9 COUGH: Status: ACTIVE | Noted: 2021-09-04

## 2021-09-04 NOTE — ASSESSMENT & PLAN NOTE
Chest x-ray read by myself.  See no evidence of pneumonia.  Benefits and risks of steroids were discussed will titrate starting at  50 mg x 1, 40 mg x 2, 30 mg x 320 mg x 4 and 10 mg x 5.  Benefits and risks of the drugs discussed.  I feel the benefits outweigh the risk.  She has recently completed a course of antibiotics.

## 2021-10-04 ENCOUNTER — TELEPHONE (OUTPATIENT)
Dept: FAMILY MEDICINE CLINIC | Facility: CLINIC | Age: 26
End: 2021-10-04

## 2021-10-04 NOTE — TELEPHONE ENCOUNTER
Caller: XI KNAPP    Relationship to patient: Mother    Best call back number:[phone]    Patient is needing: PATIENT'S MOTHER IS CALLING TO GET PATIENT IN FOR DEPRESSION.  MOTHER STATES PATIENT STOPPED TAKING HER MEDICATION A COUPLE YEARS AGO, AND HAS DONE WELL UNTIL NOW.  MOTHER STATES PATIENT IS REALLY DEPRESSED RIGHT NOW.  THE  FIRST AVAILABLE APPOINTMENT IS 11/02/21.  MOTHER IS WANTING TO KNOW IF DR. TORRE WILL PRESCRIBE THE MEDICATION THAT SHE WAS ON BEFORE STOPPING IT.  SHE DID NOT KNOW THE NAME OF THE MEDICATION.      [id number] [place] [facility] [address] - [phone]  - [phone]  [phone]    PLEASE ADVISE.

## 2021-10-05 NOTE — PROGRESS NOTES
Subjective   Shahana Peña is a 26 y.o. female.     Chief Complaint   Patient presents with   • Depression       Depression  Visit Type: follow-up  Patient presents with the following symptoms: depressed mood, nausea and nervousness/anxiety.  Patient is not experiencing: palpitations, shortness of breath, suicidal ideas, suicidal planning and thoughts of death.  Frequency of symptoms: constantly   Severity: severe   Sleep quality: poor               I personally reviewed and updated the patient's allergies, medications, problem list, and past medical, surgical, social, and family history. I have reviewed and confirmed the accuracy of the History of Present Illness and Review of Symptoms as documented by the MA/LPN/RN. Juan Hernandes MD    Family History   Problem Relation Age of Onset   • Coronary artery disease Maternal Uncle    • Coronary artery disease Paternal Uncle         6 paternal uncles   • Coronary artery disease Maternal Grandmother    • Coronary artery disease Paternal Grandmother    • Coronary artery disease Paternal Grandfather        Social History     Tobacco Use   • Smoking status: Never Smoker   • Smokeless tobacco: Never Used   Vaping Use   • Vaping Use: Never used   Substance Use Topics   • Alcohol use: No   • Drug use: No       History reviewed. No pertinent surgical history.    Patient Active Problem List   Diagnosis   • Allergic rhinitis   • Anxiety   • Depression, major, recurrent, mild (HCC)   • Gastroesophageal reflux   • Migraine   • Recurrent herpes simplex   • Seizures (HCC)   • Suspected COVID-19 virus infection   • Acute non-recurrent maxillary sinusitis   • Impacted cerumen of left ear   • Cough         Current Outpatient Medications:   •  omeprazole (prilOSEC) 10 MG capsule, Take 10 mg by mouth Daily., Disp: , Rfl:   •  azithromycin (ZITHROMAX) 250 MG tablet, Take 2 tablets the first day, then 1 tablet daily for 4 days., Disp: 6 tablet, Rfl: 0  •  sertraline (ZOLOFT) 50 MG tablet,  "Take 1 tablet by mouth Daily., Disp: 30 tablet, Rfl: 2         Review of Systems   Constitutional: Negative for chills and diaphoresis.   Eyes: Negative for visual disturbance.   Respiratory: Negative for shortness of breath.    Cardiovascular: Negative for chest pain and palpitations.   Gastrointestinal: Negative for abdominal pain and nausea.   Endocrine: Negative for polydipsia and polyphagia.   Musculoskeletal: Negative for neck stiffness.   Skin: Negative for color change and pallor.   Neurological: Negative for seizures and syncope.   Hematological: Negative for adenopathy.   Psychiatric/Behavioral: Positive for depressed mood. Negative for hallucinations and suicidal ideas. The patient is nervous/anxious.        I have reviewed and confirmed the accuracy of the ROS as documented by the MA/LPN/RN Juan Hernandes MD      Objective   /50   Pulse 90   Temp 98 °F (36.7 °C)   Resp 18   Ht 162.6 cm (64\")   Wt 58.7 kg (129 lb 6.4 oz)   SpO2 99%   Breastfeeding No   BMI 22.21 kg/m²   BP Readings from Last 3 Encounters:   10/18/21 110/70   10/06/21 122/50   08/23/21 102/68     Wt Readings from Last 3 Encounters:   10/18/21 57.6 kg (127 lb)   10/06/21 58.7 kg (129 lb 6.4 oz)   08/06/21 55.3 kg (122 lb)     Physical Exam  Constitutional:       Appearance: Normal appearance. She is well-developed. She is not diaphoretic.   Cardiovascular:      Rate and Rhythm: Normal rate and regular rhythm.      Pulses: Normal pulses.      Heart sounds: Normal heart sounds, S1 normal and S2 normal. No murmur heard.  No friction rub. No gallop.    Pulmonary:      Effort: Pulmonary effort is normal. No accessory muscle usage.      Breath sounds: Normal breath sounds. No stridor. No decreased breath sounds, wheezing, rhonchi or rales.   Abdominal:      General: Bowel sounds are normal. There is no distension.      Palpations: Abdomen is soft. Abdomen is not rigid. There is no mass or pulsatile mass.      Tenderness: There is no " abdominal tenderness. There is no guarding or rebound. Negative signs include Simental's sign.      Hernia: No hernia is present.   Skin:     General: Skin is warm and dry.      Coloration: Skin is not pale.   Neurological:      Mental Status: She is alert and oriented to person, place, and time.      Coordination: Coordination normal.      Gait: Gait normal.         Data / Lab Results:    No results found for: HGBA1C  Lab Results   Component Value Date    GLU 84 08/23/2021     No results found for: LDL, LDLDIRECT  No results found for: CHOL  No results found for: TRIG  No results found for: HDL  No results found for: PSA  Lab Results   Component Value Date    WBC 6.0 08/23/2021    HGB 12.4 08/23/2021    HCT 38.3 08/23/2021    MCV 91 08/23/2021     08/23/2021     No results found for: TSH, Z0FAHMP, F9UJRFH   Lab Results   Component Value Date    BUN 9 08/23/2021    CREATININE 0.73 08/23/2021    EGFRIFNONA 112 08/23/2021    EGFRIFAFRI 130 08/23/2021    BCR 12 08/23/2021    K 4.0 08/23/2021    CO2 24 08/23/2021    CALCIUM 9.5 08/23/2021    PROTENTOTREF 6.7 08/23/2021    ALBUMIN 4.8 08/23/2021    LABIL2 2.5 (H) 08/23/2021    AST 15 08/23/2021    ALT 14 08/23/2021     No results found for: BLANKA, RF, SEDRATE   No results found for: CRP   No results found for: IRON, TIBC, FERRITIN   No results found for: [id number]       Assessment/Plan      Medications        Problem List         LOS    UTI.  UA with signs of infection.  Start antibiotics, culture sent.  Call return if fever worsening symptoms.  GERD.  Start PPI.  Discussed diet, lifestyle modification.  DDX includes gallbladder pathology, consider right upper quadrant ultrasound if persistent symptoms.  Depression.  Recurrent today, multiple stressors, has done well on Zoloft, restart.  Had been off Rx by 1 year.  Recommend counseling she states she will consider.  Follow-up recheck 3 to 4 weeks.  Call return if worsening symptoms.      Diagnoses and all orders for  this visit:    1. Depression, major, recurrent, mild (HCC) (Primary)  -     sertraline (ZOLOFT) 50 MG tablet; Take 1 tablet by mouth Daily.  Dispense: 30 tablet; Refill: 2    2. Anxiety    3. Gastroesophageal reflux disease without esophagitis              Expected course, medications, and adverse effects discussed.  Call or return if worsening or persistent symptoms.  I wore protective equipment throughout this patient encounter including a mask, gloves, and eye protection.  Hand hygiene was performed before donning protective equipment and after removal when leaving the room. The complete contents of the Assessment and Plan and Data/Lab Results as documented above have been reviewed and addressed by myself with the patient today as part of an ongoing evaluation / treatment plan.  If some of the documentation has been copied from a previous note and is unchanged it indicates that this problem / plan has been assessed today but is stable from a previous visit and no changes have been recommended.

## 2021-10-06 ENCOUNTER — OFFICE VISIT (OUTPATIENT)
Dept: FAMILY MEDICINE CLINIC | Facility: CLINIC | Age: 26
End: 2021-10-06

## 2021-10-06 VITALS
HEIGHT: 64 IN | WEIGHT: 129.4 LBS | OXYGEN SATURATION: 99 % | BODY MASS INDEX: 22.09 KG/M2 | TEMPERATURE: 98 F | DIASTOLIC BLOOD PRESSURE: 50 MMHG | HEART RATE: 90 BPM | RESPIRATION RATE: 18 BRPM | SYSTOLIC BLOOD PRESSURE: 122 MMHG

## 2021-10-06 DIAGNOSIS — F33.0 DEPRESSION, MAJOR, RECURRENT, MILD (HCC): Primary | ICD-10-CM

## 2021-10-06 DIAGNOSIS — K21.9 GASTROESOPHAGEAL REFLUX DISEASE WITHOUT ESOPHAGITIS: ICD-10-CM

## 2021-10-06 DIAGNOSIS — F41.9 ANXIETY: ICD-10-CM

## 2021-10-06 PROCEDURE — 99214 OFFICE O/P EST MOD 30 MIN: CPT | Performed by: FAMILY MEDICINE

## 2021-10-06 RX ORDER — OMEPRAZOLE 10 MG/1
10 CAPSULE, DELAYED RELEASE ORAL DAILY
COMMUNITY
End: 2022-02-10

## 2021-10-18 ENCOUNTER — OFFICE VISIT (OUTPATIENT)
Dept: FAMILY MEDICINE CLINIC | Facility: CLINIC | Age: 26
End: 2021-10-18

## 2021-10-18 VITALS
HEART RATE: 150 BPM | BODY MASS INDEX: 21.68 KG/M2 | RESPIRATION RATE: 18 BRPM | HEIGHT: 64 IN | SYSTOLIC BLOOD PRESSURE: 110 MMHG | DIASTOLIC BLOOD PRESSURE: 70 MMHG | WEIGHT: 127 LBS | TEMPERATURE: 96.9 F | OXYGEN SATURATION: 99 %

## 2021-10-18 DIAGNOSIS — J30.9 ALLERGIC RHINITIS, UNSPECIFIED SEASONALITY, UNSPECIFIED TRIGGER: Primary | ICD-10-CM

## 2021-10-18 DIAGNOSIS — R05.9 COUGH: ICD-10-CM

## 2021-10-18 DIAGNOSIS — F33.0 DEPRESSION, MAJOR, RECURRENT, MILD (HCC): ICD-10-CM

## 2021-10-18 DIAGNOSIS — J06.9 UPPER RESPIRATORY TRACT INFECTION, UNSPECIFIED TYPE: ICD-10-CM

## 2021-10-18 PROCEDURE — 99214 OFFICE O/P EST MOD 30 MIN: CPT | Performed by: FAMILY MEDICINE

## 2021-10-18 RX ORDER — AZITHROMYCIN 250 MG/1
TABLET, FILM COATED ORAL
Qty: 6 TABLET | Refills: 0 | Status: SHIPPED | OUTPATIENT
Start: 2021-10-18 | End: 2022-02-10

## 2021-10-18 NOTE — PROGRESS NOTES
Subjective   Shahana Peña is a 26 y.o. female.     Chief Complaint   Patient presents with   • Allergic Reaction       Allergic Reaction  This is a new problem. The current episode started 3 to 5 days ago. The problem occurs constantly. The problem has been gradually worsening since onset. The problem is moderate. It is unknown what she was exposed to. Associated symptoms include chest pressure and coughing. Pertinent negatives include no abdominal pain, chest pain, diarrhea, difficulty breathing, drooling, eye itching, eye redness, eye watering, globus sensation, hyperventilation, itching, rash, vomiting or wheezing. There is no swelling present.            I personally reviewed and updated the patient's allergies, medications, problem list, and past medical, surgical, social, and family history. I have reviewed and confirmed the accuracy of the History of Present Illness and Review of Symptoms as documented by the MA/LPN/RN. Juan Hernandes MD    Family History   Problem Relation Age of Onset   • Coronary artery disease Maternal Uncle    • Coronary artery disease Paternal Uncle         6 paternal uncles   • Coronary artery disease Maternal Grandmother    • Coronary artery disease Paternal Grandmother    • Coronary artery disease Paternal Grandfather        Social History     Tobacco Use   • Smoking status: Never Smoker   • Smokeless tobacco: Never Used   Vaping Use   • Vaping Use: Never used   Substance Use Topics   • Alcohol use: No   • Drug use: No       History reviewed. No pertinent surgical history.    Patient Active Problem List   Diagnosis   • Allergic rhinitis   • Anxiety   • Depression, major, recurrent, mild (HCC)   • Gastroesophageal reflux   • Migraine   • Recurrent herpes simplex   • Seizures (HCC)   • Suspected COVID-19 virus infection   • Acute non-recurrent maxillary sinusitis   • Impacted cerumen of left ear   • Cough         Current Outpatient Medications:   •  omeprazole (prilOSEC) 10 MG  "capsule, Take 10 mg by mouth Daily., Disp: , Rfl:   •  sertraline (ZOLOFT) 50 MG tablet, Take 1 tablet by mouth Daily., Disp: 30 tablet, Rfl: 2  •  azithromycin (ZITHROMAX) 250 MG tablet, Take 2 tablets the first day, then 1 tablet daily for 4 days., Disp: 6 tablet, Rfl: 0         Review of Systems   Constitutional: Negative for chills and diaphoresis.   HENT: Negative for drooling.    Eyes: Negative for redness, itching and visual disturbance.   Respiratory: Positive for cough. Negative for shortness of breath and wheezing.    Cardiovascular: Negative for chest pain and palpitations.   Gastrointestinal: Negative for abdominal pain, diarrhea, nausea and vomiting.   Endocrine: Negative for polydipsia and polyphagia.   Musculoskeletal: Negative for neck stiffness.   Skin: Negative for color change, itching, pallor and rash.   Neurological: Negative for seizures and syncope.   Hematological: Negative for adenopathy.       I have reviewed and confirmed the accuracy of the ROS as documented by the MA/LPN/RN Juan Hernandes MD      Objective   /70   Pulse (!) 150   Temp 96.9 °F (36.1 °C)   Resp 18   Ht 162.6 cm (64\")   Wt 57.6 kg (127 lb)   SpO2 99%   Breastfeeding No   BMI 21.80 kg/m²   BP Readings from Last 3 Encounters:   10/18/21 110/70   10/06/21 122/50   08/23/21 102/68     Wt Readings from Last 3 Encounters:   10/18/21 57.6 kg (127 lb)   10/06/21 58.7 kg (129 lb 6.4 oz)   08/06/21 55.3 kg (122 lb)     Physical Exam  Constitutional:       Appearance: Normal appearance. She is well-developed. She is not diaphoretic.   HENT:      Head: Normocephalic.      Right Ear: Hearing, ear canal and external ear normal. A middle ear effusion is present. Tympanic membrane is erythematous.      Left Ear: Hearing, ear canal and external ear normal. A middle ear effusion is present. Tympanic membrane is erythematous.      Nose: Congestion present.      Right Sinus: Maxillary sinus tenderness and frontal sinus tenderness " present.      Left Sinus: Maxillary sinus tenderness and frontal sinus tenderness present.      Mouth/Throat:      Pharynx: Posterior oropharyngeal erythema present.      Tonsils: No tonsillar abscesses. 1+ on the right. 1+ on the left.   Eyes:      General: Lids are normal.      Conjunctiva/sclera: Conjunctivae normal.      Pupils: Pupils are equal, round, and reactive to light.   Neck:      Meningeal: Brudzinski's sign and Kernig's sign absent.   Cardiovascular:      Rate and Rhythm: Normal rate and regular rhythm.      Pulses: Normal pulses.      Heart sounds: Normal heart sounds, S1 normal and S2 normal. No murmur heard.  No friction rub. No gallop.    Pulmonary:      Effort: Pulmonary effort is normal. No accessory muscle usage or respiratory distress.      Breath sounds: No stridor. Examination of the right-upper field reveals wheezing and rhonchi. Examination of the left-upper field reveals wheezing and rhonchi. Examination of the right-middle field reveals wheezing and rhonchi. Examination of the left-middle field reveals wheezing and rhonchi. Examination of the right-lower field reveals wheezing and rhonchi. Examination of the left-lower field reveals wheezing and rhonchi. Wheezing and rhonchi present. No decreased breath sounds or rales.   Abdominal:      General: Bowel sounds are normal. There is no distension.      Palpations: Abdomen is soft. There is no mass.      Tenderness: There is no abdominal tenderness.      Hernia: No hernia is present.   Skin:     General: Skin is warm and dry.      Coloration: Skin is not pale.   Neurological:      Mental Status: She is alert and oriented to person, place, and time.      Cranial Nerves: No cranial nerve deficit.      Coordination: Coordination normal.      Gait: Gait normal.         Data / Lab Results:    No results found for: HGBA1C  Lab Results   Component Value Date    GLU 84 08/23/2021     No results found for: LDL, LDLDIRECT  No results found for:  CHOL  No results found for: TRIG  No results found for: HDL  No results found for: PSA  Lab Results   Component Value Date    WBC 6.0 08/23/2021    HGB 12.4 08/23/2021    HCT 38.3 08/23/2021    MCV 91 08/23/2021     08/23/2021     No results found for: TSH, O9KPZTH, [id number]   Lab Results   Component Value Date    BUN 9 08/23/2021    CREATININE 0.73 08/23/2021    EGFRIFNONA 112 08/23/2021    EGFRIFAFRI 130 08/23/2021    BCR 12 08/23/2021    K 4.0 08/23/2021    CO2 24 08/23/2021    CALCIUM 9.5 08/23/2021    PROTENTOTREF 6.7 08/23/2021    ALBUMIN 4.8 08/23/2021    LABIL2 2.5 (H) 08/23/2021    AST 15 08/23/2021    ALT 14 08/23/2021     No results found for: BLANKA, RF, SEDRATE   No results found for: CRP   No results found for: IRON, TIBC, FERRITIN   No results found for: [id number]       Assessment/Plan      Medications        Problem List         LOS  Acute bacterial bronchitis.  Start antibiotics.  Increase fluid intake.  Call return if fever worsening symptoms..  GERD.  Improved on PPI.  Discussed diet, lifestyle modification.  DDX includes gallbladder pathology, consider right upper quadrant ultrasound if persistent symptoms.  Depression.  Recurrent today, multiple stressors, improved currently back on Zoloft. Recommend counseling she states she will consider.  Follow-up recheck 3 to 4 weeks.  Call return if worsening symptoms.        Diagnoses and all orders for this visit:    1. Allergic rhinitis, unspecified seasonality, unspecified trigger (Primary)    2. Upper respiratory tract infection, unspecified type  -     azithromycin (ZITHROMAX) 250 MG tablet; Take 2 tablets the first day, then 1 tablet daily for 4 days.  Dispense: 6 tablet; Refill: 0    3. Depression, major, recurrent, mild (HCC)    4. Cough              Expected course, medications, and adverse effects discussed.  Call or return if worsening or persistent symptoms.  I wore protective equipment throughout this patient encounter including a mask,  gloves, and eye protection.  Hand hygiene was performed before donning protective equipment and after removal when leaving the room. The complete contents of the Assessment and Plan and Data/Lab Results as documented above have been reviewed and addressed by myself with the patient today as part of an ongoing evaluation / treatment plan.  If some of the documentation has been copied from a previous note and is unchanged it indicates that this problem / plan has been assessed today but is stable from a previous visit and no changes have been recommended.

## 2022-01-27 ENCOUNTER — OFFICE VISIT (OUTPATIENT)
Dept: FAMILY MEDICINE CLINIC | Facility: CLINIC | Age: 27
End: 2022-01-27

## 2022-01-27 DIAGNOSIS — Z91.199 NO-SHOW FOR APPOINTMENT: Primary | ICD-10-CM

## 2022-02-10 ENCOUNTER — OFFICE VISIT (OUTPATIENT)
Dept: FAMILY MEDICINE CLINIC | Facility: CLINIC | Age: 27
End: 2022-02-10

## 2022-02-10 VITALS
DIASTOLIC BLOOD PRESSURE: 60 MMHG | BODY MASS INDEX: 21.21 KG/M2 | SYSTOLIC BLOOD PRESSURE: 102 MMHG | OXYGEN SATURATION: 99 % | HEART RATE: 87 BPM | HEIGHT: 64 IN | TEMPERATURE: 97.7 F | RESPIRATION RATE: 16 BRPM | WEIGHT: 124.2 LBS

## 2022-02-10 DIAGNOSIS — J40 BRONCHITIS: Primary | ICD-10-CM

## 2022-02-10 PROCEDURE — 99213 OFFICE O/P EST LOW 20 MIN: CPT | Performed by: FAMILY MEDICINE

## 2022-02-10 RX ORDER — PREDNISONE 20 MG/1
40 TABLET ORAL DAILY
Qty: 10 TABLET | Refills: 0 | Status: SHIPPED | OUTPATIENT
Start: 2022-02-10 | End: 2022-02-15

## 2022-02-10 RX ORDER — CEPHALEXIN 500 MG/1
500 CAPSULE ORAL 2 TIMES DAILY
COMMUNITY
Start: 2022-01-23 | End: 2022-02-10

## 2022-02-10 RX ORDER — CIPROFLOXACIN 500 MG/1
500 TABLET, FILM COATED ORAL 2 TIMES DAILY
Qty: 20 TABLET | Refills: 0 | Status: SHIPPED | OUTPATIENT
Start: 2022-02-10 | End: 2022-05-02

## 2022-02-10 RX ORDER — GUAIFENESIN AND CODEINE PHOSPHATE 100; 10 MG/5ML; MG/5ML
5 SOLUTION ORAL 3 TIMES DAILY PRN
Qty: 180 ML | Refills: 0 | Status: SHIPPED | OUTPATIENT
Start: 2022-02-10 | End: 2022-02-20

## 2022-02-10 NOTE — ASSESSMENT & PLAN NOTE
she was prescribed Cipro, prednisone and Cheratussin to treat her symptoms.    Increase fluids. Tylenol/motrin for pain or fever.   Medication and medication adverse effects discussed.    Follow-up 5-7 days for reevaluation if not improved or sooner if needed.

## 2022-02-10 NOTE — PROGRESS NOTES
"Chief Complaint  Cough    Subjective    History of Present Illness        Shahana Peña presents to [facility] for   Cough  This is a new (Pt states she thought she had COVID-19 2 weeks ago and was not tested (fever 102, chills and congestion all symptoms have resolved except for the cough) problem. Episode onset: x 2 weeks. The problem has been unchanged. The problem occurs hourly. The cough is non-productive. Associated symptoms include ear pain (left worse than right). Pertinent negatives include no fever. Nothing aggravates the symptoms. She has tried nothing for the symptoms.        Objective   Vital Signs:   Visit Vitals  /60 (BP Location: Right arm, Patient Position: Sitting, Cuff Size: Adult)   Pulse 87   Temp 97.7 °F (36.5 °C) (Skin)   Resp 16   Ht 162.6 cm (64\")   Wt 56.3 kg (124 lb 3.2 oz)   SpO2 99%   BMI 21.32 kg/m²       Physical Exam  Vitals reviewed.   Constitutional:       Appearance: She is well-developed.   HENT:      Head: Normocephalic.      Right Ear: External ear normal.      Left Ear: External ear normal.      Nose: Nose normal.   Eyes:      Conjunctiva/sclera: Conjunctivae normal.   Cardiovascular:      Rate and Rhythm: Normal rate and regular rhythm.   Pulmonary:      Effort: Pulmonary effort is normal.      Breath sounds: Normal breath sounds.   Musculoskeletal:         General: Normal range of motion.      Cervical back: Normal range of motion and neck supple.   Skin:     General: Skin is warm and dry.      Capillary Refill: Capillary refill takes less than 2 seconds.   Neurological:      Mental Status: She is alert and oriented to person, place, and time.            Result Review :                    Assessment and Plan      Diagnoses and all orders for this visit:    1. Bronchitis (Primary)  Assessment & Plan:  she was prescribed Cipro, prednisone and Cheratussin to treat her symptoms.    Increase fluids. Tylenol/motrin for pain or fever. "   Medication and medication adverse effects discussed.    Follow-up 5-7 days for reevaluation if not improved or sooner if needed.      Orders:  -     ciprofloxacin (CIPRO) 500 MG tablet; Take 1 tablet by mouth 2 (Two) Times a Day.  Dispense: 20 tablet; Refill: 0  -     guaiFENesin-codeine (GUAIFENESIN AC) 100-10 MG/5ML liquid; Take 5 mL by mouth 3 (Three) Times a Day As Needed for Cough for up to 10 days.  Dispense: 180 mL; Refill: 0  -     predniSONE (DELTASONE) 20 MG tablet; Take 2 tablets by mouth Daily for 5 days.  Dispense: 10 tablet; Refill: 0           Follow Up   No follow-ups on file.  Patient was given instructions and counseling regarding her condition or for health maintenance advice. Please see specific information pulled into the AVS if appropriate.

## 2022-03-22 DIAGNOSIS — F33.0 DEPRESSION, MAJOR, RECURRENT, MILD: ICD-10-CM

## 2022-05-02 ENCOUNTER — OFFICE VISIT (OUTPATIENT)
Dept: FAMILY MEDICINE CLINIC | Facility: CLINIC | Age: 27
End: 2022-05-02

## 2022-05-02 VITALS
TEMPERATURE: 98.6 F | RESPIRATION RATE: 18 BRPM | HEART RATE: 86 BPM | HEIGHT: 64 IN | OXYGEN SATURATION: 97 % | DIASTOLIC BLOOD PRESSURE: 80 MMHG | WEIGHT: 124 LBS | SYSTOLIC BLOOD PRESSURE: 98 MMHG | BODY MASS INDEX: 21.17 KG/M2

## 2022-05-02 DIAGNOSIS — B96.89 ACUTE BACTERIAL BRONCHITIS: ICD-10-CM

## 2022-05-02 DIAGNOSIS — R05.9 COUGH: Primary | ICD-10-CM

## 2022-05-02 DIAGNOSIS — J20.8 ACUTE BACTERIAL BRONCHITIS: ICD-10-CM

## 2022-05-02 DIAGNOSIS — J30.9 ALLERGIC RHINITIS, UNSPECIFIED SEASONALITY, UNSPECIFIED TRIGGER: ICD-10-CM

## 2022-05-02 DIAGNOSIS — F33.0 DEPRESSION, MAJOR, RECURRENT, MILD: ICD-10-CM

## 2022-05-02 PROCEDURE — 99214 OFFICE O/P EST MOD 30 MIN: CPT | Performed by: FAMILY MEDICINE

## 2022-05-02 RX ORDER — ALBUTEROL SULFATE 90 UG/1
2 AEROSOL, METERED RESPIRATORY (INHALATION) EVERY 4 HOURS PRN
Qty: 18 G | Refills: 5 | Status: SHIPPED | OUTPATIENT
Start: 2022-05-02 | End: 2022-10-13 | Stop reason: SDUPTHER

## 2022-05-02 RX ORDER — AZITHROMYCIN 250 MG/1
TABLET, FILM COATED ORAL
Qty: 6 TABLET | Refills: 0 | Status: SHIPPED | OUTPATIENT
Start: 2022-05-02 | End: 2022-07-20

## 2022-05-02 NOTE — PROGRESS NOTES
Subjective   Shahana Peña is a 27 y.o. female.     Chief Complaint   Patient presents with   • Cough       Cough  This is a new problem. The current episode started in the past 7 days. The problem has been unchanged. The cough is productive of blood-tinged sputum. Associated symptoms include shortness of breath. Pertinent negatives include no chest pain or chills. Nothing aggravates the symptoms. Risk factors for lung disease include animal exposure. Her past medical history is significant for bronchitis and environmental allergies. There is no history of pneumonia.            I personally reviewed and updated the patient's allergies, medications, problem list, and past medical, surgical, social, and family history. I have reviewed and confirmed the accuracy of the History of Present Illness and Review of Symptoms as documented by the MA/LPN/RN. Juan Hernandes MD    Family History   Problem Relation Age of Onset   • Coronary artery disease Maternal Uncle    • Coronary artery disease Paternal Uncle         6 paternal uncles   • Coronary artery disease Maternal Grandmother    • Coronary artery disease Paternal Grandmother    • Coronary artery disease Paternal Grandfather        Social History     Tobacco Use   • Smoking status: Never Smoker   • Smokeless tobacco: Never Used   Vaping Use   • Vaping Use: Never used   Substance Use Topics   • Alcohol use: No   • Drug use: No       History reviewed. No pertinent surgical history.    Patient Active Problem List   Diagnosis   • Allergic rhinitis   • Anxiety   • Depression, major, recurrent, mild (HCC)   • Gastroesophageal reflux   • Migraine   • Recurrent herpes simplex   • Seizures (HCC)   • Suspected COVID-19 virus infection   • Acute non-recurrent maxillary sinusitis   • Impacted cerumen of left ear   • Cough   • Bronchitis   • Acute bacterial bronchitis         Current Outpatient Medications:   •  sertraline (ZOLOFT) 50 MG tablet, Take 1 tablet by mouth once daily,  "Disp: 30 tablet, Rfl: 5  •  albuterol sulfate  (90 Base) MCG/ACT inhaler, Inhale 2 puffs Every 4 (Four) Hours As Needed for Wheezing or Shortness of Air., Disp: 18 g, Rfl: 5  •  azithromycin (ZITHROMAX) 250 MG tablet, Take 2 tablets the first day, then 1 tablet daily for 4 days., Disp: 6 tablet, Rfl: 0         Review of Systems   Constitutional: Negative for chills and diaphoresis.   Eyes: Negative for visual disturbance.   Respiratory: Positive for cough and shortness of breath.    Cardiovascular: Negative for chest pain and palpitations.   Gastrointestinal: Negative for abdominal pain and nausea.   Endocrine: Negative for polydipsia and polyphagia.   Musculoskeletal: Negative for neck stiffness.   Skin: Negative for color change and pallor.   Allergic/Immunologic: Positive for environmental allergies.   Neurological: Negative for seizures and syncope.   Hematological: Negative for adenopathy.   Psychiatric/Behavioral: Negative for hallucinations and suicidal ideas.       I have reviewed and confirmed the accuracy of the ROS as documented by the MA/LPN/RN Juan Hernandes MD      Objective   BP 98/80 (BP Location: Left arm, Patient Position: Sitting)   Pulse 86   Temp 98.6 °F (37 °C)   Resp 18   Ht 162.6 cm (64\")   Wt 56.2 kg (124 lb)   LMP 04/25/2022 (Approximate)   SpO2 97%   Breastfeeding No   BMI 21.28 kg/m²   BP Readings from Last 3 Encounters:   05/02/22 98/80   02/10/22 102/60   10/18/21 110/70     Wt Readings from Last 3 Encounters:   05/02/22 56.2 kg (124 lb)   02/10/22 56.3 kg (124 lb 3.2 oz)   10/18/21 57.6 kg (127 lb)     Physical Exam  Constitutional:       Appearance: Normal appearance. She is well-developed. She is not diaphoretic.   HENT:      Head: Normocephalic.      Right Ear: Hearing, ear canal and external ear normal. A middle ear effusion is present. Tympanic membrane is erythematous.      Left Ear: Hearing, ear canal and external ear normal. A middle ear effusion is present. " Tympanic membrane is erythematous.      Nose: Congestion present.      Right Sinus: Maxillary sinus tenderness and frontal sinus tenderness present.      Left Sinus: Maxillary sinus tenderness and frontal sinus tenderness present.      Mouth/Throat:      Pharynx: Posterior oropharyngeal erythema present.      Tonsils: No tonsillar abscesses. 1+ on the right. 1+ on the left.   Eyes:      General: Lids are normal.      Conjunctiva/sclera: Conjunctivae normal.      Pupils: Pupils are equal, round, and reactive to light.   Neck:      Meningeal: Brudzinski's sign and Kernig's sign absent.   Cardiovascular:      Rate and Rhythm: Normal rate and regular rhythm.      Pulses: Normal pulses.      Heart sounds: Normal heart sounds, S1 normal and S2 normal. No murmur heard.    No friction rub. No gallop.   Pulmonary:      Effort: Pulmonary effort is normal. No accessory muscle usage or respiratory distress.      Breath sounds: Normal breath sounds. No stridor. No decreased breath sounds, wheezing, rhonchi or rales.   Abdominal:      General: Bowel sounds are normal. There is no distension.      Palpations: Abdomen is soft. Abdomen is not rigid. There is no mass or pulsatile mass.      Tenderness: There is no abdominal tenderness. There is no guarding or rebound. Negative signs include Simental's sign.      Hernia: No hernia is present.   Skin:     General: Skin is warm and dry.      Coloration: Skin is not pale.   Neurological:      Mental Status: She is alert and oriented to person, place, and time.      Cranial Nerves: No cranial nerve deficit.      Coordination: Coordination normal.      Gait: Gait normal.         Data / Lab Results:    No results found for: HGBA1C     No results found for: LDL, LDLDIRECT  No results found for: CHOL  No results found for: TRIG  No results found for: HDL  No results found for: PSA  Lab Results   Component Value Date    WBC 6.0 08/23/2021    HGB 12.4 08/23/2021    HCT 38.3 08/23/2021    MCV 91  08/23/2021     08/23/2021     No results found for: TSH, H1DQTMP, I1CYXHB   Lab Results   Component Value Date    GLUCOSE 84 08/23/2021    BUN 9 08/23/2021    CREATININE 0.73 08/23/2021    EGFRIFNONA 112 08/23/2021    EGFRIFAFRI 130 08/23/2021    BCR 12 08/23/2021    K 4.0 08/23/2021    CO2 24 08/23/2021    CALCIUM 9.5 08/23/2021    PROTENTOTREF 6.7 08/23/2021    ALBUMIN 4.8 08/23/2021    LABIL2 2.5 (H) 08/23/2021    AST 15 08/23/2021    ALT 14 08/23/2021     No results found for: BLANKA, RF, SEDRATE   No results found for: CRP   No results found for: IRON, TIBC, FERRITIN   No results found for: [id number]       Assessment/Plan      Medications        Problem List         LOS    Acute bacterial bronchitis.  Start antibiotics.  Increase fluid intake.  Call return if fever worsening symptoms..  Having some blood-tinged mucus, overall benign exam today, oxygen saturation normal.    GERD.  Improved today taking PPI as needed.  Discussed diet, lifestyle modification.  DDX includes gallbladder pathology, consider right upper quadrant ultrasound if persistent symptoms.  Depression.  Improved today back on sertraline.  Good social support.  Consider counseling.      Diagnoses and all orders for this visit:    1. Cough (Primary)  -     azithromycin (ZITHROMAX) 250 MG tablet; Take 2 tablets the first day, then 1 tablet daily for 4 days.  Dispense: 6 tablet; Refill: 0  -     albuterol sulfate  (90 Base) MCG/ACT inhaler; Inhale 2 puffs Every 4 (Four) Hours As Needed for Wheezing or Shortness of Air.  Dispense: 18 g; Refill: 5    2. Acute bacterial bronchitis    3. Depression, major, recurrent, mild (HCC)    4. Allergic rhinitis, unspecified seasonality, unspecified trigger              Expected course, medications, and adverse effects discussed.  Call or return if worsening or persistent symptoms.  I wore protective equipment throughout this patient encounter including a mask, gloves, and eye protection.  Hand  hygiene was performed before donning protective equipment and after removal when leaving the room. The complete contents of the Assessment and Plan and Data/Lab Results as documented above have been reviewed and addressed by myself with the patient today as part of an ongoing evaluation / treatment plan.  If some of the documentation has been copied from a previous note and is unchanged it indicates that this problem / plan has been assessed today but is stable from a previous visit and no changes have been recommended.

## 2022-07-20 ENCOUNTER — OFFICE VISIT (OUTPATIENT)
Dept: FAMILY MEDICINE CLINIC | Facility: CLINIC | Age: 27
End: 2022-07-20

## 2022-07-20 VITALS
HEART RATE: 89 BPM | WEIGHT: 122.6 LBS | RESPIRATION RATE: 16 BRPM | TEMPERATURE: 97.5 F | SYSTOLIC BLOOD PRESSURE: 118 MMHG | DIASTOLIC BLOOD PRESSURE: 78 MMHG | BODY MASS INDEX: 20.93 KG/M2 | HEIGHT: 64 IN | OXYGEN SATURATION: 100 %

## 2022-07-20 DIAGNOSIS — J01.00 ACUTE NON-RECURRENT MAXILLARY SINUSITIS: ICD-10-CM

## 2022-07-20 DIAGNOSIS — R05.9 COUGH: Primary | ICD-10-CM

## 2022-07-20 DIAGNOSIS — J30.1 SEASONAL ALLERGIC RHINITIS DUE TO POLLEN: ICD-10-CM

## 2022-07-20 LAB
EXPIRATION DATE: NORMAL
FLUAV AG UPPER RESP QL IA.RAPID: NOT DETECTED
FLUBV AG UPPER RESP QL IA.RAPID: NOT DETECTED
INTERNAL CONTROL: NORMAL
Lab: NORMAL
SARS-COV-2 AG UPPER RESP QL IA.RAPID: NOT DETECTED

## 2022-07-20 PROCEDURE — 87428 SARSCOV & INF VIR A&B AG IA: CPT | Performed by: FAMILY MEDICINE

## 2022-07-20 PROCEDURE — 99213 OFFICE O/P EST LOW 20 MIN: CPT | Performed by: FAMILY MEDICINE

## 2022-07-20 RX ORDER — PREDNISONE 10 MG/1
10 TABLET ORAL TAKE AS DIRECTED
Qty: 35 TABLET | Refills: 0 | Status: SHIPPED | OUTPATIENT
Start: 2022-07-20 | End: 2022-08-04

## 2022-07-20 RX ORDER — MEDROXYPROGESTERONE ACETATE 150 MG/ML
INJECTION, SUSPENSION INTRAMUSCULAR
COMMUNITY
Start: 2022-06-03

## 2022-07-20 RX ORDER — ACYCLOVIR 800 MG/1
TABLET ORAL
COMMUNITY
Start: 2022-07-14

## 2022-07-20 RX ORDER — AMOXICILLIN 500 MG/1
500 TABLET, FILM COATED ORAL 3 TIMES DAILY
Qty: 30 TABLET | Refills: 0 | Status: SHIPPED | OUTPATIENT
Start: 2022-07-20 | End: 2022-10-13

## 2022-10-13 ENCOUNTER — OFFICE VISIT (OUTPATIENT)
Dept: FAMILY MEDICINE CLINIC | Facility: CLINIC | Age: 27
End: 2022-10-13

## 2022-10-13 VITALS
HEART RATE: 90 BPM | HEIGHT: 64 IN | TEMPERATURE: 98.1 F | OXYGEN SATURATION: 97 % | RESPIRATION RATE: 18 BRPM | WEIGHT: 125 LBS | SYSTOLIC BLOOD PRESSURE: 98 MMHG | BODY MASS INDEX: 21.34 KG/M2 | DIASTOLIC BLOOD PRESSURE: 66 MMHG

## 2022-10-13 DIAGNOSIS — J02.9 SORE THROAT: ICD-10-CM

## 2022-10-13 DIAGNOSIS — J20.9 ACUTE BRONCHITIS, UNSPECIFIED ORGANISM: Primary | ICD-10-CM

## 2022-10-13 DIAGNOSIS — J30.1 SEASONAL ALLERGIC RHINITIS DUE TO POLLEN: ICD-10-CM

## 2022-10-13 DIAGNOSIS — H69.83 ETD (EUSTACHIAN TUBE DYSFUNCTION), BILATERAL: ICD-10-CM

## 2022-10-13 LAB
EXPIRATION DATE: NORMAL
EXPIRATION DATE: NORMAL
FLUAV AG UPPER RESP QL IA.RAPID: NOT DETECTED
FLUBV AG UPPER RESP QL IA.RAPID: NOT DETECTED
INTERNAL CONTROL: NORMAL
INTERNAL CONTROL: NORMAL
Lab: NORMAL
Lab: NORMAL
S PYO AG THROAT QL: NEGATIVE
SARS-COV-2 AG UPPER RESP QL IA.RAPID: NOT DETECTED

## 2022-10-13 PROCEDURE — 87880 STREP A ASSAY W/OPTIC: CPT | Performed by: NURSE PRACTITIONER

## 2022-10-13 PROCEDURE — 99213 OFFICE O/P EST LOW 20 MIN: CPT | Performed by: NURSE PRACTITIONER

## 2022-10-13 PROCEDURE — 87428 SARSCOV & INF VIR A&B AG IA: CPT | Performed by: NURSE PRACTITIONER

## 2022-10-13 RX ORDER — ALBUTEROL SULFATE 90 UG/1
2 AEROSOL, METERED RESPIRATORY (INHALATION) EVERY 4 HOURS PRN
Qty: 18 G | Refills: 5 | Status: SHIPPED | OUTPATIENT
Start: 2022-10-13

## 2022-10-13 RX ORDER — PREDNISONE 20 MG/1
20 TABLET ORAL 2 TIMES DAILY
Qty: 10 TABLET | Refills: 0 | Status: SHIPPED | OUTPATIENT
Start: 2022-10-13 | End: 2022-10-18

## 2022-10-13 RX ORDER — FLUTICASONE PROPIONATE 50 MCG
SPRAY, SUSPENSION (ML) NASAL
Qty: 15.8 ML | Refills: 2 | Status: SHIPPED | OUTPATIENT
Start: 2022-10-13

## 2022-10-13 RX ORDER — FEXOFENADINE HCL 180 MG/1
180 TABLET ORAL DAILY
COMMUNITY

## 2022-10-13 RX ORDER — BENZONATATE 200 MG/1
200 CAPSULE ORAL 3 TIMES DAILY PRN
Qty: 30 CAPSULE | Refills: 0 | Status: SHIPPED | OUTPATIENT
Start: 2022-10-13 | End: 2022-10-23

## 2022-10-13 NOTE — PROGRESS NOTES
Chief Complaint  Cough and Sore Throat    Subjective          Shahana Peña presents to [facility] for     History of Present Illness    Cough, headache and sore throat  Cough and headache for 3-4 days.  Sore throat started last night  No fever, rash  No ill contacts  Non smoker.  Recent antibiotic use - amoxicillin on 7/20/22 for sinusitis.  She has seasonal allergies  She is taking allegra 180 mg daily  She previously took allergy shots  She took Tylenol yesterday which helped decrease her headache.        Shahana Peña  has a past medical history of Absence of menstruation, Acute non-recurrent maxillary sinusitis, Acute pharyngitis, Acute reaction to stress, Acute serous otitis media, Acute sinusitis, Allergic rhinitis, Anxiety, Cervical cancer screening, Cough, Depression, major, recurrent, mild (HCC), Dysuria, Encounter for routine gynecological examination, Encounter for special screening examination for genitourinary disorder, Enlargement of lymph node, Exposure to strep throat, External hemorrhoid, External otitis, Fever, unknown origin, Gastroesophageal reflux, Headache, acute, Hemorrhoid, Impetigo, Influenza, Influenza-like illness, Malaise, Menses, irregular, Migraine, Migraine with aura and without status migrainosus, not intractable, Myalgia, Nausea and vomiting, Pain of left scapula, Palpitations, Pelvic pain, Pregnant state, incidental, Recurrent herpes simplex, Screening examination for sexually transmitted disease, Seizure (HCC), Suicidal ideation, Syncope and collapse, Tachycardia, TMJ arthralgia, Underweight, Urinary tract infection, Vaginitis, Viral upper respiratory infection, and Wart.      Review of Systems   Constitutional: Positive for chills. Negative for fever.   HENT: Positive for ear pain (left), postnasal drip, sneezing and sore throat. Negative for rhinorrhea, sinus pressure, sinus pain and tinnitus.    Eyes: Negative for itching.   Respiratory:  Positive for cough (dry). Negative for shortness of breath.    Cardiovascular: Negative for chest pain.   Gastrointestinal: Positive for diarrhea and nausea. Negative for abdominal pain and vomiting.   Skin: Negative for rash.   Allergic/Immunologic: Positive for environmental allergies.   Neurological: Positive for headaches.   Hematological: Negative for adenopathy.        Family History   Problem Relation Age of Onset   • Coronary artery disease Maternal Uncle    • Coronary artery disease Paternal Uncle         6 paternal uncles   • Coronary artery disease Maternal Grandmother    • Coronary artery disease Paternal Grandmother    • Coronary artery disease Paternal Grandfather         History reviewed. No pertinent surgical history.     Social History     Socioeconomic History   • Marital status: Single   Tobacco Use   • Smoking status: Never   • Smokeless tobacco: Never   Vaping Use   • Vaping Use: Never used   Substance and Sexual Activity   • Alcohol use: No   • Drug use: No   • Sexual activity: Yes     Partners: Male     Birth control/protection: Injection        Objective       Current Outpatient Medications:   •  acyclovir (ZOVIRAX) 800 MG tablet, , Disp: , Rfl:   •  albuterol sulfate  (90 Base) MCG/ACT inhaler, Inhale 2 puffs Every 4 (Four) Hours As Needed for Wheezing or Shortness of Air., Disp: 18 g, Rfl: 5  •  fexofenadine (ALLEGRA) 180 MG tablet, Take 1 tablet by mouth Daily., Disp: , Rfl:   •  medroxyPROGESTERone Acetate 150 MG/ML suspension prefilled syringe, INJECT 1 ML INTRAMUSCULARLY EVERY 3 MONTHS, Disp: , Rfl:   •  sertraline (ZOLOFT) 50 MG tablet, Take 1 tablet by mouth once daily, Disp: 30 tablet, Rfl: 5  •  benzonatate (TESSALON) 200 MG capsule, Take 1 capsule by mouth 3 (Three) Times a Day As Needed for Cough for up to 10 days., Disp: 30 capsule, Rfl: 0  •  fluticasone (FLONASE) 50 MCG/ACT nasal spray, 1-2 sprays in each nostril once daily for allergies, Disp: 15.8 mL, Rfl: 2  •   "predniSONE (DELTASONE) 20 MG tablet, Take 1 tablet by mouth 2 (Two) Times a Day for 5 days., Disp: 10 tablet, Rfl: 0    Vital Signs:      BP 98/66 (BP Location: Left arm, Patient Position: Sitting, Cuff Size: Adult)   Pulse 90   Temp 98.1 °F (36.7 °C)   Resp 18   Ht 162.6 cm (64\")   Wt 56.7 kg (125 lb)   SpO2 97%   BMI 21.46 kg/m²     Vitals:    10/13/22 0855   BP: 98/66   BP Location: Left arm   Patient Position: Sitting   Cuff Size: Adult   Pulse: 90   Resp: 18   Temp: 98.1 °F (36.7 °C)   SpO2: 97%   Weight: 56.7 kg (125 lb)   Height: 162.6 cm (64\")      Physical Exam  Vitals reviewed.   Constitutional:       General: She is not in acute distress.     Appearance: Normal appearance. She is not ill-appearing.   HENT:      Head: Normocephalic and atraumatic.      Right Ear: Ear canal and external ear normal. Tympanic membrane is retracted. Tympanic membrane is not erythematous.      Left Ear: Ear canal and external ear normal. Tympanic membrane is retracted. Tympanic membrane is not erythematous.      Nose: Congestion and rhinorrhea present.      Mouth/Throat:      Mouth: Mucous membranes are moist.      Pharynx: Oropharynx is clear. No oropharyngeal exudate or posterior oropharyngeal erythema.   Eyes:      General: No scleral icterus.     Conjunctiva/sclera: Conjunctivae normal.   Neck:      Thyroid: No thyromegaly.   Cardiovascular:      Rate and Rhythm: Normal rate and regular rhythm.      Heart sounds: Normal heart sounds.   Pulmonary:      Effort: Pulmonary effort is normal. No respiratory distress.      Breath sounds: Normal breath sounds. No wheezing.   Musculoskeletal:      Cervical back: Neck supple.      Right lower leg: No edema.      Left lower leg: No edema.   Lymphadenopathy:      Cervical: No cervical adenopathy.   Skin:     General: Skin is warm and dry.   Neurological:      Mental Status: She is alert and oriented to person, place, and time.   Psychiatric:         Mood and Affect: Mood normal. "        Result Review :             Strep    Common Labsle 10/13/22   POC Strep A, Molecular Negative           COVID and influenza A&B = not detected                            Assessment and Plan    Diagnoses and all orders for this visit:    1. Acute bronchitis, unspecified organism (Primary)  -     predniSONE (DELTASONE) 20 MG tablet; Take 1 tablet by mouth 2 (Two) Times a Day for 5 days.  Dispense: 10 tablet; Refill: 0  -     benzonatate (TESSALON) 200 MG capsule; Take 1 capsule by mouth 3 (Three) Times a Day As Needed for Cough for up to 10 days.  Dispense: 30 capsule; Refill: 0  -     albuterol sulfate  (90 Base) MCG/ACT inhaler; Inhale 2 puffs Every 4 (Four) Hours As Needed for Wheezing or Shortness of Air.  Dispense: 18 g; Refill: 5    2. ETD (Eustachian tube dysfunction), bilateral  -     fluticasone (FLONASE) 50 MCG/ACT nasal spray; 1-2 sprays in each nostril once daily for allergies  Dispense: 15.8 mL; Refill: 2    3. Seasonal allergic rhinitis due to pollen  Assessment & Plan:  Continue Allegra 180 mg daily and begin Flonase nasal spray daily      4. Sore throat  -     POCT SARS-CoV-2 Antigen YAIR  -     POC Rapid Strep A           Follow Up   Return if symptoms worsen or fail to improve.  Patient was given instructions and counseling regarding her condition or for health maintenance advice. Please see specific information pulled into the AVS if appropriate.    Patient Instructions   Begin prednisone 20 mg twice daily for 5 days for cough.   Use Tessalon Perles 200 mg 3 times daily as needed for cough.  Use your albuterol inhaler as needed for cough, shortness of breath and wheezing.  Continue Allegra 180 mg daily.  Begin Flonase nasal spray 1 to 2 sprays each nostril daily for allergies.  Follow-up if symptoms worsen or if not better in 48- 72 hours.

## 2022-10-13 NOTE — PATIENT INSTRUCTIONS
Begin prednisone 20 mg twice daily for 5 days for cough.   Use Tessalon Perles 200 mg 3 times daily as needed for cough.  Use your albuterol inhaler as needed for cough, shortness of breath and wheezing.  Continue Allegra 180 mg daily.  Begin Flonase nasal spray 1 to 2 sprays each nostril daily for allergies.  Follow-up if symptoms worsen or if not better in 48- 72 hours.

## 2023-05-01 ENCOUNTER — OFFICE VISIT (OUTPATIENT)
Dept: FAMILY MEDICINE CLINIC | Facility: CLINIC | Age: 28
End: 2023-05-01
Payer: MEDICAID

## 2023-05-01 VITALS
RESPIRATION RATE: 18 BRPM | OXYGEN SATURATION: 99 % | HEIGHT: 64 IN | BODY MASS INDEX: 19.53 KG/M2 | TEMPERATURE: 97.9 F | WEIGHT: 114.4 LBS | SYSTOLIC BLOOD PRESSURE: 102 MMHG | HEART RATE: 84 BPM | DIASTOLIC BLOOD PRESSURE: 68 MMHG

## 2023-05-01 DIAGNOSIS — J06.9 UPPER RESPIRATORY TRACT INFECTION, UNSPECIFIED TYPE: Primary | ICD-10-CM

## 2023-05-01 DIAGNOSIS — R35.0 URINARY FREQUENCY: ICD-10-CM

## 2023-05-01 LAB
BILIRUB BLD-MCNC: NEGATIVE MG/DL
CLARITY, POC: ABNORMAL
COLOR UR: YELLOW
GLUCOSE UR STRIP-MCNC: NEGATIVE MG/DL
KETONES UR QL: NEGATIVE
LEUKOCYTE EST, POC: ABNORMAL
NITRITE UR-MCNC: NEGATIVE MG/ML
PH UR: 9 [PH] (ref 5–8)
PROT UR STRIP-MCNC: ABNORMAL MG/DL
RBC # UR STRIP: NEGATIVE /UL
SP GR UR: 1.01 (ref 1–1.03)
UROBILINOGEN UR QL: NORMAL

## 2023-05-01 RX ORDER — CEPHALEXIN 500 MG/1
500 CAPSULE ORAL 3 TIMES DAILY
Qty: 30 CAPSULE | Refills: 0 | Status: SHIPPED | OUTPATIENT
Start: 2023-05-01 | End: 2023-05-11

## 2023-05-01 NOTE — PROGRESS NOTES
Subjective   Shahana Peña is a 28 y.o. female.   Chief Complaint   Patient presents with   • URI   • Urinary Frequency       History of Present Illness  Hurts to breathe. No fever.   Dry cough  Also urinary frequency, urinary incontinence, hurts to pee. No blood.      Started 3 days ago  URI   This is a new problem. The current episode started in the past 7 days. There has been no fever. Associated symptoms include congestion, coughing, ear pain, a plugged ear sensation and a sore throat. Pertinent negatives include no abdominal pain, chest pain, diarrhea, dysuria, nausea, neck pain, rash, swollen glands, vomiting or wheezing. She has tried nothing for the symptoms. The treatment provided no relief.   Urinary Frequency   This is a new problem. The current episode started in the past 7 days. The problem occurs every urination. The quality of the pain is described as aching and burning. There has been no fever. Associated symptoms include frequency and urgency. Pertinent negatives include no hematuria, nausea or vomiting. She has tried nothing for the symptoms.        The following portions of the patient's history were reviewed and updated as appropriate: allergies, current medications, past family history, past medical history, past social history, past surgical history and problem list.    Patient Active Problem List   Diagnosis   • Allergic rhinitis   • Anxiety   • Depression, major, recurrent, mild (HCC)   • Gastroesophageal reflux   • Migraine   • Recurrent herpes simplex   • Seizures   • Suspected COVID-19 virus infection   • Acute non-recurrent maxillary sinusitis   • Impacted cerumen of left ear   • Cough   • Bronchitis   • Acute bacterial bronchitis       Current Outpatient Medications on File Prior to Visit   Medication Sig Dispense Refill   • acyclovir (ZOVIRAX) 800 MG tablet      • albuterol sulfate  (90 Base) MCG/ACT inhaler Inhale 2 puffs Every 4 (Four) Hours As Needed for Wheezing or Shortness  "of Air. 18 g 5   • fexofenadine (ALLEGRA) 180 MG tablet Take 1 tablet by mouth Daily.     • fluticasone (FLONASE) 50 MCG/ACT nasal spray 1-2 sprays in each nostril once daily for allergies 15.8 mL 2   • medroxyPROGESTERone Acetate 150 MG/ML suspension prefilled syringe INJECT 1 ML INTRAMUSCULARLY EVERY 3 MONTHS     • sertraline (ZOLOFT) 50 MG tablet Take 1 tablet by mouth once daily 30 tablet 5     No current facility-administered medications on file prior to visit.     Current outpatient and discharge medications have been reconciled for the patient.  Reviewed by: Ben Maradiaga MD      No Known Allergies    Review of Systems   Constitutional: Negative for activity change, appetite change, fatigue and fever.   HENT: Positive for congestion, ear pain and sore throat. Negative for swollen glands and voice change.    Eyes: Negative for visual disturbance.   Respiratory: Positive for cough. Negative for shortness of breath and wheezing.    Cardiovascular: Negative for chest pain and leg swelling.   Gastrointestinal: Negative for abdominal pain, blood in stool, constipation, diarrhea, nausea and vomiting.   Endocrine: Negative for polydipsia and polyuria.   Genitourinary: Positive for frequency and urgency. Negative for dysuria and hematuria.   Musculoskeletal: Negative for joint swelling, neck pain and neck stiffness.   Skin: Negative for rash and wound.   Neurological: Negative for weakness, numbness and headache.   Psychiatric/Behavioral: Negative for suicidal ideas and depressed mood.     I have reviewed and confirmed the accuracy of the ROS as documented by the MA/LPN/RN Ben Maradiaga MD    Objective   Visit Vitals  /68 (BP Location: Right arm, Patient Position: Sitting, Cuff Size: Adult)   Pulse 84   Temp 97.9 °F (36.6 °C)   Resp 18   Ht 162.6 cm (64\")   Wt 51.9 kg (114 lb 6.4 oz)   SpO2 99%   BMI 19.64 kg/m²      **  Physical Exam  Constitutional:       Appearance: She is well-developed. "   HENT:      Head: Normocephalic and atraumatic.      Right Ear: External ear normal.      Left Ear: External ear normal.      Nose: Nose normal.   Eyes:      Pupils: Pupils are equal, round, and reactive to light.   Cardiovascular:      Rate and Rhythm: Normal rate and regular rhythm.      Heart sounds: Normal heart sounds.   Pulmonary:      Effort: Pulmonary effort is normal.      Breath sounds: Normal breath sounds.   Abdominal:      General: Bowel sounds are normal.      Palpations: Abdomen is soft.   Musculoskeletal:         General: Normal range of motion.      Cervical back: Normal range of motion and neck supple.   Skin:     General: Skin is warm and dry.   Neurological:      Mental Status: She is alert and oriented to person, place, and time.   Psychiatric:         Behavior: Behavior normal.         Thought Content: Thought content normal.         Judgment: Judgment normal.       Derm Physical Exam    Diagnoses and all orders for this visit:    1. Upper respiratory tract infection, unspecified type (Primary)  -     cephalexin (KEFLEX) 500 MG capsule; Take 1 capsule by mouth 3 (Three) Times a Day for 10 days.  Dispense: 30 capsule; Refill: 0    2. Urinary frequency  -     POC Urinalysis Dipstick, Multipro  -     Urine Culture - Urine, Urine, Random Void  -     cephalexin (KEFLEX) 500 MG capsule; Take 1 capsule by mouth 3 (Three) Times a Day for 10 days.  Dispense: 30 capsule; Refill: 0     Findings discussed. All questions answered.  Differential diagnosis discussed.   Medication and medication adverse effects discussed.  Drug education given and explained to patient. Patient verbalized understanding.  Follow-up in 1 week if not better.  Follow-up sooner for worsening symptoms or for any concerns.    BMI is within normal parameters. No other follow-up for BMI required.    Expected course, medications, and adverse effects discussed as appropriate.  Call or return if worsening or persistent symptoms.     This  document is intended for medical professional use only.

## 2023-05-03 LAB
BACTERIA UR CULT: NORMAL
BACTERIA UR CULT: NORMAL

## 2024-02-01 ENCOUNTER — TELEPHONE (OUTPATIENT)
Dept: FAMILY MEDICINE CLINIC | Facility: CLINIC | Age: 29
End: 2024-02-01
Payer: MEDICAID

## 2024-02-01 NOTE — TELEPHONE ENCOUNTER
Tried to called patient but no answer,    appt needs to be rescheduled with another provider, this is a double booked appt

## 2024-02-14 NOTE — PROGRESS NOTES
Subjective   Shahana Peña is a 29 y.o. female.     Chief Complaint   Patient presents with    Depression       Depression  Visit Type: follow-up  Patient is not experiencing: decreased concentration, depressed mood, fatigue, insomnia, nausea, palpitations, panic, shortness of breath, suicidal ideas, suicidal planning and thoughts of death.  Frequency of symptoms: constantly   Severity: severe   Sleep quality: good             I personally reviewed and updated the patient's allergies, medications, problem list, and past medical, surgical, social, and family history. I have reviewed and confirmed the accuracy of the History of Present Illness and Review of Symptoms as documented by the MA/LPN/RN. Juan Hernandes MD    Family History   Problem Relation Age of Onset    Coronary artery disease Maternal Uncle     Coronary artery disease Paternal Uncle         6 paternal uncles    Coronary artery disease Maternal Grandmother     Coronary artery disease Paternal Grandmother     Coronary artery disease Paternal Grandfather        Social History     Tobacco Use    Smoking status: Never     Passive exposure: Never    Smokeless tobacco: Never   Vaping Use    Vaping Use: Never used   Substance Use Topics    Alcohol use: No    Drug use: No       History reviewed. No pertinent surgical history.    Patient Active Problem List   Diagnosis    Allergic rhinitis    Anxiety    Depression, major, recurrent, mild    Gastroesophageal reflux    Migraine    Recurrent herpes simplex    Seizures    Suspected COVID-19 virus infection    Acute non-recurrent maxillary sinusitis    Impacted cerumen of left ear    Cough    Bronchitis    Acute bacterial bronchitis         Current Outpatient Medications:     medroxyPROGESTERone Acetate (DEPO-PROVERA IM), 150 mg., Disp: , Rfl:     sertraline (ZOLOFT) 50 MG tablet, Take 1 tablet by mouth Daily., Disp: 30 tablet, Rfl: 5    acyclovir (ZOVIRAX) 800 MG tablet, , Disp: , Rfl:     albuterol sulfate   "(90 Base) MCG/ACT inhaler, Inhale 2 puffs Every 4 (Four) Hours As Needed for Wheezing or Shortness of Air., Disp: 18 g, Rfl: 5    fexofenadine (ALLEGRA) 180 MG tablet, Take 1 tablet by mouth Daily., Disp: , Rfl:     fluticasone (FLONASE) 50 MCG/ACT nasal spray, 1-2 sprays in each nostril once daily for allergies, Disp: 15.8 mL, Rfl: 2    medroxyPROGESTERone Acetate 150 MG/ML suspension prefilled syringe, INJECT 1 ML INTRAMUSCULARLY EVERY 3 MONTHS, Disp: , Rfl:          Review of Systems   Constitutional:  Negative for chills and diaphoresis.   HENT:  Negative for trouble swallowing and voice change.    Eyes:  Negative for visual disturbance.   Respiratory:  Negative for shortness of breath.    Cardiovascular:  Negative for chest pain and palpitations.   Gastrointestinal:  Negative for abdominal pain and nausea.   Endocrine: Negative for polydipsia and polyphagia.   Genitourinary:  Negative for hematuria.   Musculoskeletal:  Negative for neck stiffness.   Skin:  Negative for color change and pallor.   Allergic/Immunologic: Negative for immunocompromised state.   Neurological:  Negative for seizures and syncope.   Hematological:  Negative for adenopathy.   Psychiatric/Behavioral:  Negative for decreased concentration, hallucinations, sleep disturbance, suicidal ideas and depressed mood. The patient does not have insomnia.        I have reviewed and confirmed the accuracy of the ROS as documented by the MA/LPN/RN Juan Hernandes MD      Objective   /78 (BP Location: Left arm, Patient Position: Sitting, Cuff Size: Large Adult)   Pulse 109   Temp 98 °F (36.7 °C) (Tympanic)   Resp 16   Ht 162.6 cm (64\")   Wt 55.3 kg (122 lb)   LMP  (LMP Unknown) Comment: Recently had baby 12/6/23 and depo started 1/19/24  SpO2 99%   BMI 20.94 kg/m²   BP Readings from Last 3 Encounters:   02/19/24 118/78   05/01/23 102/68   10/13/22 98/66     Wt Readings from Last 3 Encounters:   02/19/24 55.3 kg (122 lb)   05/01/23 51.9 kg " (114 lb 6.4 oz)   10/13/22 56.7 kg (125 lb)     Physical Exam  Constitutional:       Appearance: She is well-developed. She is not diaphoretic.   HENT:      Head: Normocephalic.      Right Ear: Tympanic membrane, ear canal and external ear normal.      Left Ear: Tympanic membrane, ear canal and external ear normal.      Nose: Nose normal.   Eyes:      General: Lids are normal.      Conjunctiva/sclera: Conjunctivae normal.      Pupils: Pupils are equal, round, and reactive to light.   Neck:      Thyroid: No thyromegaly.      Vascular: No carotid bruit or JVD.      Trachea: No tracheal deviation.   Cardiovascular:      Rate and Rhythm: Normal rate and regular rhythm.      Heart sounds: Normal heart sounds. No murmur heard.     No friction rub. No gallop.   Pulmonary:      Effort: Pulmonary effort is normal.      Breath sounds: Normal breath sounds. No stridor. No decreased breath sounds, wheezing or rales.   Abdominal:      General: Bowel sounds are normal. There is no distension.      Palpations: Abdomen is soft. There is no mass.      Tenderness: There is no abdominal tenderness. There is no guarding or rebound.      Hernia: No hernia is present.   Lymphadenopathy:      Head:      Right side of head: No submental, submandibular, tonsillar, preauricular, posterior auricular or occipital adenopathy.      Left side of head: No submental, submandibular, tonsillar, preauricular, posterior auricular or occipital adenopathy.      Cervical: No cervical adenopathy.   Skin:     General: Skin is warm and dry.      Coloration: Skin is not pale.   Neurological:      Mental Status: She is alert and oriented to person, place, and time.      Cranial Nerves: No cranial nerve deficit.      Sensory: No sensory deficit.      Motor: No tremor, abnormal muscle tone or seizure activity.      Coordination: Coordination normal.      Gait: Gait normal.      Deep Tendon Reflexes: Reflexes are normal and symmetric.         Data / Lab  "Results:    No results found for: \"HGBA1C\"     No results found for: \"LDL\", \"LDLDIRECT\"  No results found for: \"CHOL\"  No results found for: \"TRIG\"  No results found for: \"HDL\"  No results found for: \"PSA\"  Lab Results   Component Value Date    WBC 6.0 08/23/2021    HGB 12.4 08/23/2021    HCT 38.3 08/23/2021    MCV 91 08/23/2021     08/23/2021     No results found for: \"TSH\", \"Q5NRTXF\", \"Q1QUBJO\", \"THYROIDAB\"   Lab Results   Component Value Date    GLUCOSE 84 08/23/2021    BUN 9 08/23/2021    CREATININE 0.73 08/23/2021    EGFRIFNONA 112 08/23/2021    EGFRIFAFRI 130 08/23/2021    BCR 12 08/23/2021    K 4.0 08/23/2021    CO2 24 08/23/2021    CALCIUM 9.5 08/23/2021    PROTENTOTREF 6.7 08/23/2021    ALBUMIN 4.8 08/23/2021    LABIL2 2.5 (H) 08/23/2021    AST 15 08/23/2021    ALT 14 08/23/2021     No results found for: \"BLANKA\", \"RF\", \"SEDRATE\"   No results found for: \"CRP\"   No results found for: \"IRON\", \"TIBC\", \"FERRITIN\"   No results found for: \"LPZYVSMZ28\"       Assessment & Plan      Medications        Problem List         LOS    GERD.  Improved today taking PPI as needed.  Discussed diet, lifestyle modification.  DDX includes gallbladder pathology, consider right upper quadrant ultrasound if persistent symptoms.  Depression.  Overall improved today back on sertraline, flare currently/2 months postpartum, dose increased, overall coping well..  Good social support.  Consider counseling.  Follow-up recheck.  Follow-up visit for comprehensive physical exam and screening test scheduled.          Diagnoses and all orders for this visit:    1. Depression, major, recurrent, mild (Primary)  -     sertraline (ZOLOFT) 50 MG tablet; Take 1 tablet by mouth Daily.  Dispense: 30 tablet; Refill: 5              Expected course, medications, and adverse effects discussed.  Call or return if worsening or persistent symptoms.  I wore protective equipment throughout this patient encounter including a mask, gloves, and eye " protection.  Hand hygiene was performed before donning protective equipment and after removal when leaving the room. The complete contents of the Assessment and Plan and Data/Lab Results as documented above have been reviewed and addressed by myself with the patient today as part of an ongoing evaluation / treatment plan.  If some of the documentation has been copied from a previous note and is unchanged it indicates that this problem / plan has been assessed today but is stable from a previous visit and no changes have been recommended.

## 2024-02-19 ENCOUNTER — OFFICE VISIT (OUTPATIENT)
Dept: FAMILY MEDICINE CLINIC | Facility: CLINIC | Age: 29
End: 2024-02-19
Payer: MEDICAID

## 2024-02-19 VITALS
HEART RATE: 109 BPM | BODY MASS INDEX: 20.83 KG/M2 | DIASTOLIC BLOOD PRESSURE: 78 MMHG | HEIGHT: 64 IN | WEIGHT: 122 LBS | SYSTOLIC BLOOD PRESSURE: 118 MMHG | OXYGEN SATURATION: 99 % | RESPIRATION RATE: 16 BRPM | TEMPERATURE: 98 F

## 2024-02-19 DIAGNOSIS — Z13.220 SCREENING FOR LIPID DISORDERS: ICD-10-CM

## 2024-02-19 DIAGNOSIS — R53.81 MALAISE AND FATIGUE: ICD-10-CM

## 2024-02-19 DIAGNOSIS — R53.83 MALAISE AND FATIGUE: ICD-10-CM

## 2024-02-19 DIAGNOSIS — Z13.29 SCREENING FOR THYROID DISORDER: ICD-10-CM

## 2024-02-19 DIAGNOSIS — F33.0 DEPRESSION, MAJOR, RECURRENT, MILD: Primary | ICD-10-CM

## 2024-02-19 PROCEDURE — 99213 OFFICE O/P EST LOW 20 MIN: CPT | Performed by: FAMILY MEDICINE

## 2024-04-16 ENCOUNTER — OFFICE VISIT (OUTPATIENT)
Dept: FAMILY MEDICINE CLINIC | Facility: CLINIC | Age: 29
End: 2024-04-16
Payer: MEDICAID

## 2024-04-16 ENCOUNTER — TELEPHONE (OUTPATIENT)
Dept: FAMILY MEDICINE CLINIC | Facility: CLINIC | Age: 29
End: 2024-04-16
Payer: MEDICAID

## 2024-04-16 VITALS
BODY MASS INDEX: 20.22 KG/M2 | WEIGHT: 118.4 LBS | DIASTOLIC BLOOD PRESSURE: 62 MMHG | SYSTOLIC BLOOD PRESSURE: 110 MMHG | HEART RATE: 67 BPM | HEIGHT: 64 IN | RESPIRATION RATE: 18 BRPM | TEMPERATURE: 98.2 F | OXYGEN SATURATION: 100 %

## 2024-04-16 DIAGNOSIS — R10.32 LLQ ABDOMINAL PAIN: ICD-10-CM

## 2024-04-16 DIAGNOSIS — N76.0 BV (BACTERIAL VAGINOSIS): ICD-10-CM

## 2024-04-16 DIAGNOSIS — N89.8 VAGINAL DISCHARGE: ICD-10-CM

## 2024-04-16 DIAGNOSIS — K92.1 BLOOD IN STOOL: ICD-10-CM

## 2024-04-16 DIAGNOSIS — B96.89 BV (BACTERIAL VAGINOSIS): ICD-10-CM

## 2024-04-16 DIAGNOSIS — R19.7 DIARRHEA, UNSPECIFIED TYPE: Primary | ICD-10-CM

## 2024-04-16 PROCEDURE — 99214 OFFICE O/P EST MOD 30 MIN: CPT | Performed by: FAMILY MEDICINE

## 2024-04-16 RX ORDER — METRONIDAZOLE 500 MG/1
500 TABLET ORAL 2 TIMES DAILY
Qty: 14 TABLET | Refills: 0 | Status: SHIPPED | OUTPATIENT
Start: 2024-04-16

## 2024-04-16 NOTE — PROGRESS NOTES
Chief Complaint  Diarrhea and Rectal Bleeding    History of Present Illness  Shahana Peña presents today for new onset concern of diarrhea and blood in stool      Diarrhea: Patient complains of diarrhea. Onset of diarrhea was yesterday. Diarrhea is occurring approximately 4 times per day. Patient describes diarrhea as bloody and watery. Diarrhea has been associated with  nausea .  Patient denies fever, illness in household contacts, recent antibiotic use, recent camping, recent travel, significant abdominal pain, unintentional weight loss.  Previous visits for diarrhea: none. Evaluation to date: none. Treatment to date: none.   Just started cramping.   Last night had slimy red mucus. BM this am was a little less watery/a little bit more formed and only a small pinkish mucus mixed with the stool.   Have not eaten or drank anything today.     Additionally, patient states she may have a yeast infection.  She is having more abundant vaginal discharge and an odor starting 2-3 weeks ago.  She denies any itching or irritation.  She has had yeast infections in the past and does not feel the same.   4 months post partum  with 3rd child, Jeny.     Patient is current on Pap exams, obtain annually through Dr. Collins.  She is on Depo-Provera.  She denies any history of abnormal Paps in the past.        Patient Care Team:  Juan Hernandes MD as PCP - General  Juan Hernandes MD as PCP - Family Medicine   Current Outpatient Medications on File Prior to Visit   Medication Sig    fexofenadine (ALLEGRA) 180 MG tablet Take 1 tablet by mouth Daily.    medroxyPROGESTERone Acetate (DEPO-PROVERA IM) 150 mg.    sertraline (ZOLOFT) 50 MG tablet Take 1 tablet by mouth Daily.    acyclovir (ZOVIRAX) 800 MG tablet  (Patient not taking: Reported on 4/16/2024)    albuterol sulfate  (90 Base) MCG/ACT inhaler Inhale 2 puffs Every 4 (Four) Hours As Needed for Wheezing or Shortness of Air. (Patient not taking: Reported on 4/16/2024)     "fluticasone (FLONASE) 50 MCG/ACT nasal spray 1-2 sprays in each nostril once daily for allergies (Patient not taking: Reported on 4/16/2024)    medroxyPROGESTERone Acetate 150 MG/ML suspension prefilled syringe INJECT 1 ML INTRAMUSCULARLY EVERY 3 MONTHS (Patient not taking: Reported on 4/16/2024)     No current facility-administered medications on file prior to visit.       Objective   Vital Signs:   /62 (BP Location: Right arm, Patient Position: Sitting, Cuff Size: Adult)   Pulse 67   Temp 98.2 °F (36.8 °C) (Temporal)   Resp 18   Ht 162.6 cm (64\")   Wt 53.7 kg (118 lb 6.4 oz)   SpO2 100%   BMI 20.32 kg/m²    BP Readings from Last 3 Encounters:   04/16/24 110/62   02/19/24 118/78   05/01/23 102/68     Wt Readings from Last 3 Encounters:   04/16/24 53.7 kg (118 lb 6.4 oz)   02/19/24 55.3 kg (122 lb)   05/01/23 51.9 kg (114 lb 6.4 oz)         Physical Exam  Vitals and nursing note reviewed.   Constitutional:       General: She is not in acute distress.     Appearance: She is well-developed.   HENT:      Head: Normocephalic and atraumatic.      Right Ear: External ear normal.      Left Ear: External ear normal.      Nose: Nose normal.   Eyes:      General: No scleral icterus.        Right eye: No discharge.         Left eye: No discharge.      Conjunctiva/sclera: Conjunctivae normal.      Pupils: Pupils are equal, round, and reactive to light.   Cardiovascular:      Rate and Rhythm: Normal rate and regular rhythm.      Heart sounds: No murmur heard.  Pulmonary:      Effort: Pulmonary effort is normal.      Breath sounds: No wheezing.   Abdominal:      General: Bowel sounds are normal. There is no distension.      Palpations: Abdomen is soft. There is no mass.      Tenderness: There is abdominal tenderness (Left lower quadrant). There is no right CVA tenderness, left CVA tenderness, guarding or rebound.      Hernia: No hernia is present.   Genitourinary:     General: Normal vulva.      Vagina: Vaginal " discharge present.      Adnexa:         Right: No tenderness.          Left: No tenderness.        Comments: Pelvic exam: normal external genitalia, vulva, vagina, cervix, uterus and adnexa, there is abundant grayish-white somewhat adherent discharge.  Cervix appears healthy, there is a version of transition zone no discharge through cervical os.  There is no cervical motion tenderness, tenderness to palpation of the uterus or adnexa.  Uterus is not enlarged.  Collected sample for vaginosis panel, gonorrhea, and chlamydia.  Pap not collected, not indicated    RECTAL: No external hemorrhoids or external lesions visible  Musculoskeletal:         General: Normal range of motion.      Cervical back: Normal range of motion.   Lymphadenopathy:      Cervical: No cervical adenopathy.   Skin:     General: Skin is warm and dry.   Neurological:      Mental Status: She is alert and oriented to person, place, and time.            Office Visit on 04/16/2024   Component Date Value Ref Range Status    Atopobium Vaginae 04/16/2024 High - 2 (A)  Score Final    BVAB 2 04/16/2024 High - 2 (A)  Score Final    Megasphaera 1 04/16/2024 High - 2 (A)  Score Final    Comment: Calculate total score by adding the 3 individual bacterial  vaginosis (BV) marker scores together.  Total score is  interpreted as follows:  Total score 0-1: Indicates the absence of BV.  Total score   2: Indeterminate for BV. Additional clinical                   data should be evaluated to establish a                   diagnosis.  Total score 3-6: Indicates the presence of BV.  This test was developed and its performance characteristics  determined by [facility].  It has not been cleared or approved  by the Food and Drug Administration.      Rhina Albicans, LU 04/16/2024 Negative  Negative Final    Rhina Glabrata, UL 04/16/2024 Negative  Negative Final    Trichomonas vaginosis 04/16/2024 Negative  Negative Final    Chlamydia trachomatis, LU 04/16/2024 Negative   "Negative Final    Neisseria gonorrhoeae, LU 04/16/2024 Negative  Negative Final       No results found for: \"CHOL\", \"CHLPL\", \"TRIG\", \"HDL\", \"LDL\", \"LDLDIRECT\"  No results found for: \"TSH\", \"J9VLDEO\", \"Y0XRXRO\", \"THYROIDAB\"  Lab Results   Component Value Date    GLUCOSE 84 08/23/2021    BUN 9 08/23/2021    CREATININE 0.73 08/23/2021    EGFRIFNONA 112 08/23/2021    EGFRIFAFRI 130 08/23/2021    BCR 12 08/23/2021    K 4.0 08/23/2021    CO2 24 08/23/2021    CALCIUM 9.5 08/23/2021    PROTENTOTREF 6.7 08/23/2021    ALBUMIN 4.8 08/23/2021    LABIL2 2.5 (H) 08/23/2021    AST 15 08/23/2021    ALT 14 08/23/2021     Lab Results   Component Value Date    WBC 6.0 08/23/2021    HGB 12.4 08/23/2021    HCT 38.3 08/23/2021    MCV 91 08/23/2021     08/23/2021                      Assessment and Plan    Diagnoses and all orders for this visit:    1. Diarrhea, unspecified type (Primary)    2. Blood in stool    3. LLQ abdominal pain  -     NuSwab VG+ - Swab, Cervix; Future  -     NuSwab VG+ - Swab, Cervix    4. Vaginal discharge  -     NuSwab VG+ - Swab, Cervix; Future  -     NuSwab VG+ - Swab, Cervix    5. BV (bacterial vaginosis)  -     metroNIDAZOLE (Flagyl) 500 MG tablet; Take 1 tablet by mouth 2 (Two) Times a Day.  Dispense: 14 tablet; Refill: 0        1 day of diarrhea with visible blood.  Only 1 stool today and symptoms are improving.  She does not have family history or personal history of Crohn's or ulcerative colitis or other inflammatory bowel diseases.  Suspect this is an acute viral illness already improving.  Stressed need to stay hydrated replace electrolytes with Gatorade or similar electrolyte containing beverage and start with a bland diet advancing diet as tolerated.  Advised if rectal bleeding persists should return to see Dr. Reinoehl, or can place referral to gastroenterology for further evaluation.  Stressed if significant pain, large amount of bleeding, symptoms of anemia or dehydration (lightheadedness, " dizziness, syncope, weakness, tachycardia, dyspnea etc.) to seek urgent evaluation through emergency department.    Vaginal discharge with odor most consistent with bacterial vaginosis.  Sample collected today.  Patient elects to start treatment now as opposed to waiting for results for confirmation.  Prescription for Flagyl.    There are no discontinued medications.      Follow Up     Return if symptoms worsen or fail to improve.    Patient was given instructions and counseling regarding her condition or for health maintenance advice. Please see specific information pulled into the AVS.

## 2024-04-18 LAB
A VAGINAE DNA VAG QL NAA+PROBE: ABNORMAL SCORE
BVAB2 DNA VAG QL NAA+PROBE: ABNORMAL SCORE
C ALBICANS DNA VAG QL NAA+PROBE: NEGATIVE
C GLABRATA DNA VAG QL NAA+PROBE: NEGATIVE
C TRACH DNA VAG QL NAA+PROBE: NEGATIVE
MEGA1 DNA VAG QL NAA+PROBE: ABNORMAL SCORE
N GONORRHOEA DNA VAG QL NAA+PROBE: NEGATIVE
T VAGINALIS DNA VAG QL NAA+PROBE: NEGATIVE

## 2024-04-18 NOTE — PROGRESS NOTES
I have sent the following message to patient through [employer]:  Shahana,  Bacterial vaginosis swab was positive, confirming bacterial vaginosis.  Should complete course of metronidazole and if symptoms persist need to discuss with Dr. Hernandes or gynecologist.  Unrelated to lab result if rectal bleeding continues need to schedule sooner appointment with Dr. Hernandes or gastroenterology.

## 2025-01-08 ENCOUNTER — OFFICE VISIT (OUTPATIENT)
Dept: FAMILY MEDICINE CLINIC | Facility: CLINIC | Age: 30
End: 2025-01-08
Payer: MEDICAID

## 2025-01-08 VITALS
TEMPERATURE: 98.6 F | DIASTOLIC BLOOD PRESSURE: 72 MMHG | BODY MASS INDEX: 22.67 KG/M2 | SYSTOLIC BLOOD PRESSURE: 112 MMHG | HEART RATE: 119 BPM | RESPIRATION RATE: 18 BRPM | OXYGEN SATURATION: 99 % | WEIGHT: 132.8 LBS | HEIGHT: 64 IN

## 2025-01-08 DIAGNOSIS — R09.82 POSTNASAL DRIP: ICD-10-CM

## 2025-01-08 DIAGNOSIS — J01.90 ACUTE SINUSITIS, RECURRENCE NOT SPECIFIED, UNSPECIFIED LOCATION: ICD-10-CM

## 2025-01-08 DIAGNOSIS — H66.91 ACUTE OTITIS MEDIA, RIGHT: ICD-10-CM

## 2025-01-08 DIAGNOSIS — J02.9 SORE THROAT: ICD-10-CM

## 2025-01-08 DIAGNOSIS — J06.9 UPPER RESPIRATORY TRACT INFECTION, UNSPECIFIED TYPE: Primary | ICD-10-CM

## 2025-01-08 PROCEDURE — 87428 SARSCOV & INF VIR A&B AG IA: CPT

## 2025-01-08 PROCEDURE — 99214 OFFICE O/P EST MOD 30 MIN: CPT

## 2025-01-08 PROCEDURE — 1126F AMNT PAIN NOTED NONE PRSNT: CPT

## 2025-01-08 PROCEDURE — 87880 STREP A ASSAY W/OPTIC: CPT

## 2025-01-08 RX ORDER — IPRATROPIUM BROMIDE 21 UG/1
2 SPRAY, METERED NASAL EVERY 12 HOURS
Qty: 28 ML | Refills: 0 | Status: SHIPPED | OUTPATIENT
Start: 2025-01-08 | End: 2025-01-15

## 2025-01-08 RX ORDER — DEXTROMETHORPHAN HYDROBROMIDE, BUPROPION HYDROCHLORIDE 105; 45 MG/1; MG/1
1 TABLET, MULTILAYER, EXTENDED RELEASE ORAL EVERY 12 HOURS SCHEDULED
COMMUNITY
Start: 2024-12-18

## 2025-01-08 NOTE — PROGRESS NOTES
"Chief Complaint  URI    Subjective          Shahana Peña presents to [facility] for     HPI  Patient presents to the office today with complaints of productive cough, runny nose, fever, body aches, and headache that started yesterday. Denies any nausea, vomiting, diarrhea, chest pain, palpitations or shortness of breath. Reports she has taken Mucinex and Motrin otc with minimal relief.  Congested, thick yellow nasal discharge.  Coughing up thick green sputum.  Bilateral sinus pressure.      Objective   Vital Signs:   /72 (BP Location: Right arm, Patient Position: Sitting, Cuff Size: Adult)   Pulse 119   Temp 98.6 °F (37 °C) (Temporal)   Resp 18   Ht 162.6 cm (64\")   Wt 60.2 kg (132 lb 12.8 oz)   SpO2 99% Comment: room air  BMI 22.80 kg/m²     Physical Exam  Vitals and nursing note reviewed.   Constitutional:       General: She is not in acute distress.     Appearance: Normal appearance. She is ill-appearing. She is not toxic-appearing or diaphoretic.   HENT:      Head: Normocephalic and atraumatic.      Right Ear: Tympanic membrane is erythematous.      Left Ear: There is impacted cerumen.      Nose: Congestion and rhinorrhea present.      Mouth/Throat:      Pharynx: Uvula midline. No pharyngeal swelling or oropharyngeal exudate.   Eyes:      Extraocular Movements: Extraocular movements intact.      Pupils: Pupils are equal, round, and reactive to light.   Cardiovascular:      Rate and Rhythm: Normal rate and regular rhythm.      Pulses: Normal pulses.   Pulmonary:      Effort: Pulmonary effort is normal.      Breath sounds: Normal breath sounds.   Musculoskeletal:         General: Normal range of motion.      Cervical back: Normal range of motion and neck supple.   Lymphadenopathy:      Cervical: Cervical adenopathy (bilateral) present.   Skin:     General: Skin is warm and dry.      Capillary Refill: Capillary refill takes less than 2 seconds.   Neurological:      " Mental Status: She is alert and oriented to person, place, and time.   Psychiatric:         Mood and Affect: Mood normal.         Behavior: Behavior normal.        Result Review :                Assessment and Plan    Diagnoses and all orders for this visit:    1. Upper respiratory tract infection, unspecified type (Primary)  -     POCT SARS-CoV-2 + Flu Antigen YAIR    2. Sore throat  -     POC Rapid Strep A    3. Acute otitis media, right  -     amoxicillin-clavulanate (AUGMENTIN) 875-125 MG per tablet; Take 1 tablet by mouth 2 (Two) Times a Day for 7 days.  Dispense: 14 tablet; Refill: 0    4. Acute sinusitis, recurrence not specified, unspecified location  -     amoxicillin-clavulanate (AUGMENTIN) 875-125 MG per tablet; Take 1 tablet by mouth 2 (Two) Times a Day for 7 days.  Dispense: 14 tablet; Refill: 0    5. Postnasal drip  -     ipratropium (ATROVENT) 0.03 % nasal spray; Administer 2 sprays into the nostril(s) as directed by provider Every 12 (Twelve) Hours for 7 days.  Dispense: 28 mL; Refill: 0    Flu, COVID, and strep are negative.  Patient does have a right AOM which we will treat with Augmentin.  This would also cover for a bacterial sinusitis as well as typical pneumonia.  Strict return/ED precautions advised and understood.    Follow Up   Return if symptoms worsen or fail to improve.  Patient Instructions   Follow up if symptoms don't improve in 3-4 days or go to the ED if symptoms worsen.  Rest.  Drink plenty of fluids, avoid caffeinated beverages.  Wash your hands with soap and water for at least 20 seconds.  Clean high-touch surfaces at least once a day.  Take any prescription medications as prescribed.  Tylenol every 6 hours as needed for pain or fever per over-the-counter dosing.  Ibuprofen, Motrin or Advil every 6 hours as needed for pain or fever per over-the-counter dosing; take with food.  Mucinex as needed for cough - dose as directed.  If you have nasal congestion or sinus pressure, you may  take Flonase or similar nasal spray or antihistamines such as Claritin/Zyrtec/Allegra as needed for congestion.  Gargle with warm salt water to soothe a sore throat.    The following vitamins are recommended daily for 2 weeks to boost your immune system:  Vitamin D3 1000 IU daily  Zinc OTC take as directed on bottle daily  Melatonin 5 mg at bedtime  Vitamin C 1000 mg daily     Wenceslao Carrillo MD    NOTE: [employer], per Health Information accessibility laws, allows progress notes to be visible to patients. Please note that these notes will include professional medical terminology that may be somewhat confusing without some interpretation from your medical professional team. The intent of progress notes is to communicate information between any medical professionals involved in your case, or to serve as future reference for myself or any other provider when reviewing your case, as well as a reference for the patient viewing the record. Please ask a member of the medical team if you have any questions about terminology or content of note.    DISCLAIMER: Part of this note may be an electronic transcription/translation of spoken language to printed text using the Dragon Dictation System.

## 2025-01-08 NOTE — PATIENT INSTRUCTIONS
Follow up if symptoms don't improve in 3-4 days or go to the ED if symptoms worsen.  Rest.  Drink plenty of fluids, avoid caffeinated beverages.  Wash your hands with soap and water for at least 20 seconds.  Clean high-touch surfaces at least once a day.  Take any prescription medications as prescribed.  Tylenol every 6 hours as needed for pain or fever per over-the-counter dosing.  Ibuprofen, Motrin or Advil every 6 hours as needed for pain or fever per over-the-counter dosing; take with food.  Mucinex as needed for cough - dose as directed.  If you have nasal congestion or sinus pressure, you may take Flonase or similar nasal spray or antihistamines such as Claritin/Zyrtec/Allegra as needed for congestion.  Gargle with warm salt water to soothe a sore throat.    The following vitamins are recommended daily for 2 weeks to boost your immune system:  Vitamin D3 1000 IU daily  Zinc OTC take as directed on bottle daily  Melatonin 5 mg at bedtime  Vitamin C 1000 mg daily

## 2025-07-01 ENCOUNTER — OFFICE VISIT (OUTPATIENT)
Dept: FAMILY MEDICINE CLINIC | Facility: CLINIC | Age: 30
End: 2025-07-01
Payer: OTHER GOVERNMENT

## 2025-07-01 VITALS
TEMPERATURE: 98 F | WEIGHT: 137.8 LBS | HEIGHT: 64 IN | SYSTOLIC BLOOD PRESSURE: 110 MMHG | OXYGEN SATURATION: 99 % | HEART RATE: 104 BPM | RESPIRATION RATE: 18 BRPM | DIASTOLIC BLOOD PRESSURE: 62 MMHG | BODY MASS INDEX: 23.52 KG/M2

## 2025-07-01 DIAGNOSIS — N30.00 ACUTE CYSTITIS WITHOUT HEMATURIA: ICD-10-CM

## 2025-07-01 DIAGNOSIS — R30.0 BURNING WITH URINATION: ICD-10-CM

## 2025-07-01 DIAGNOSIS — R10.30 LOWER ABDOMINAL PAIN: ICD-10-CM

## 2025-07-01 DIAGNOSIS — R35.0 URINARY FREQUENCY: Primary | ICD-10-CM

## 2025-07-01 LAB
BILIRUB BLD-MCNC: NEGATIVE MG/DL
CLARITY, POC: CLEAR
COLOR UR: ABNORMAL
GLUCOSE UR STRIP-MCNC: NEGATIVE MG/DL
KETONES UR QL: NEGATIVE
LEUKOCYTE EST, POC: ABNORMAL
NITRITE UR-MCNC: NEGATIVE MG/ML
PH UR: 6 [PH] (ref 5–8)
PROT UR STRIP-MCNC: ABNORMAL MG/DL
RBC # UR STRIP: ABNORMAL /UL
SP GR UR: 1 (ref 1–1.03)
UROBILINOGEN UR QL: NORMAL

## 2025-07-01 RX ORDER — DULOXETIN HYDROCHLORIDE 20 MG/1
20 CAPSULE, DELAYED RELEASE ORAL DAILY
COMMUNITY

## 2025-07-01 RX ORDER — SULFAMETHOXAZOLE AND TRIMETHOPRIM 800; 160 MG/1; MG/1
1 TABLET ORAL 2 TIMES DAILY
Qty: 14 TABLET | Refills: 0 | Status: SHIPPED | OUTPATIENT
Start: 2025-07-01

## 2025-07-01 NOTE — PROGRESS NOTES
Chief Complaint  Urinary Tract Infection    History of Present Illness  Shahana Peña presents today for new onset concern of UTI    Patient complains of burning with urination, frequency, and pain in the lower abdomen. She has had symptoms for 2 days. Patient denies back pain, fever, and vaginal discharge. Patient taking OTC Azo medication.    History of Present Illness  The patient is a 30-year-old female who presents for a same-day appointment for a possible urinary tract infection (UTI).    She reports symptoms for 2 days, including burning with urination, frequency, and pain in the lower abdomen. She reports no fevers, back pain, or vaginal discharge. She has a history of infrequent UTIs. She began taking Azo this morning, which has provided some relief. She experienced pain yesterday but did not have any discomfort during urination until this morning. Additionally, she reports severe pain when bending over, located just above her pubic bone.     She is sexually active and reports no discomfort during intercourse, vaginal discharge, or new symptoms. She has been with her current partner for a year and has no concerns about sexually transmitted infections or yeast infections. Her last sexual activity was approximately 4 nights ago. She reports no nausea. She is uncertain if she has taken Bactrim, but reviewing chart she was prescribed previously for urinary tract infection 4 years ago.  She does not recall any side effects from it. She reports no possibility of pregnancy and has not missed any doses of her Depo-Provera.     She has no history of polycystic ovaries or ovarian cysts. She does not have menstrual periods on Depo-Provera and believes she has had a pelvic ultrasound in the past. She has had 3 children and undergoes annual gynecologic exams. Her last exam was in 12/2024. She does not use douches.     She is currently on Depo-Provera and low-dose duloxetine. She was advised to stop sertraline when she  started Cymbalta. She occasionally uses allergy medications during allergy seasons but tries to avoid medication as much as possible. 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 She has been on antidepressants since she was 15 years old and has tried different ones. She is unsure if she was prescribed dextromethorphan and bupropion in the past but states she is not currently taking it, removing it from her medication list today. She has not seen a psychiatrist recently.      Patient Care Team:  Juan Hernandes MD as PCP - General  Juan Hernandes MD as PCP - Family Medicine   Current Outpatient Medications on File Prior to Visit   Medication Sig    DULoxetine (CYMBALTA) 20 MG capsule Take 1 capsule by mouth Daily.    medroxyPROGESTERone Acetate (DEPO-PROVERA IM) 150 mg.    acyclovir (ZOVIRAX) 800 MG tablet  (Patient not taking: Reported on 4/16/2024)    albuterol sulfate  (90 Base) MCG/ACT inhaler Inhale 2 puffs Every 4 (Four) Hours As Needed for Wheezing or Shortness of Air. (Patient not taking: Reported on 4/16/2024)    fexofenadine (ALLEGRA) 180 MG tablet Take 1 tablet by mouth Daily. (Patient not taking: Reported on 7/1/2025)    fluticasone (FLONASE) 50 MCG/ACT nasal spray 1-2 sprays in each nostril once daily for allergies (Patient not taking: Reported on 4/16/2024)    ipratropium (ATROVENT) 0.03 % nasal spray Administer 2 sprays into the nostril(s) as directed by provider Every 12 (Twelve) Hours for 7 days. (Patient not taking: Reported on 7/1/2025)    medroxyPROGESTERone Acetate 150 MG/ML suspension prefilled syringe  (Patient not taking: Reported on 7/1/2025)    [DISCONTINUED] Auvelity  MG tablet controlled-release Take 1 tablet by mouth Every 12 (Twelve) Hours. (Patient not taking: Reported on 7/1/2025)    [DISCONTINUED] sertraline (ZOLOFT) 50 MG tablet Take 1 tablet by mouth Daily. (Patient not taking: Reported on 7/1/2025)     No current facility-administered medications on file prior  "to visit.       Objective   Vital Signs:   /62 (BP Location: Right arm, Patient Position: Sitting, Cuff Size: Large Adult)   Pulse 104   Temp 98 °F (36.7 °C) (Temporal)   Resp 18   Ht 162.6 cm (64\")   Wt 62.5 kg (137 lb 12.8 oz)   SpO2 99%   BMI 23.65 kg/m²    BP Readings from Last 3 Encounters:   07/01/25 110/62   01/08/25 112/72   04/16/24 110/62     Wt Readings from Last 3 Encounters:   07/01/25 62.5 kg (137 lb 12.8 oz)   01/08/25 60.2 kg (132 lb 12.8 oz)   04/16/24 53.7 kg (118 lb 6.4 oz)         Physical Exam  Vitals and nursing note reviewed.   Constitutional:       General: She is not in acute distress.     Appearance: She is well-developed.   HENT:      Head: Normocephalic and atraumatic.   Neck:      Comments:  Thyroid gland enlarged non-tender, no palpable masses.  Cardiovascular:      Rate and Rhythm: Normal rate and regular rhythm.      Heart sounds: No murmur heard.  Pulmonary:      Effort: Pulmonary effort is normal.      Breath sounds: Normal breath sounds. No wheezing.   Abdominal:      General: Abdomen is flat. Bowel sounds are normal.      Palpations: Abdomen is soft.      Tenderness: There is abdominal tenderness. There is no guarding or rebound.      Hernia: No hernia is present.      Comments:  Mild tenderness noted in low abdomen/pelvis particularly over the adnexa bilaterally   Musculoskeletal:         General: Normal range of motion.      Cervical back: Neck supple. No rigidity.   Skin:     General: Skin is warm and dry.      Findings: No rash.   Neurological:      Mental Status: She is alert and oriented to person, place, and time.          Physical Exam        Office Visit on 07/01/2025   Component Date Value Ref Range Status    Color 07/01/2025 Red (A)  Yellow, Straw, Dark Yellow, Olga Final    patient is on Azo    Clarity, UA 07/01/2025 Clear  Clear Final    Glucose, UA 07/01/2025 Negative  Negative mg/dL Final    Bilirubin 07/01/2025 Negative  Negative Final    Ketones, UA " "07/01/2025 Negative  Negative Final    Specific Gravity  07/01/2025 1.005  1.005 - 1.030 Final    Blood, UA 07/01/2025 Large (A)  Negative Final    pH, Urine 07/01/2025 6.0  5.0 - 8.0 Final    Protein, POC 07/01/2025 Trace (A)  Negative mg/dL Final    Urobilinogen, UA 07/01/2025 Normal  Normal, 0.2 E.U./dL Final    Leukocytes 07/01/2025 Moderate (2+) (A)  Negative Final    Nitrite, UA 07/01/2025 Negative  Negative Final       No results found for: \"CHOL\", \"CHLPL\", \"TRIG\", \"HDL\", \"LDL\", \"LDLDIRECT\"  No results found for: \"TSH\", \"X2OBFIW\", \"L4TWCWA\", \"THYROIDAB\"  Lab Results   Component Value Date    GLUCOSE 84 08/23/2021    BUN 9 08/23/2021    CREATININE 0.73 08/23/2021    EGFRIFNONA 112 08/23/2021    EGFRIFAFRI 130 08/23/2021    BCR 12 08/23/2021    K 4.0 08/23/2021    CO2 24 08/23/2021    CALCIUM 9.5 08/23/2021    ALBUMIN 4.8 08/23/2021    AST 15 08/23/2021    ALT 14 08/23/2021     Lab Results   Component Value Date    WBC 6.0 08/23/2021    HGB 12.4 08/23/2021    HCT 38.3 08/23/2021    MCV 91 08/23/2021     08/23/2021             Results  Labs   - Urinalysis: Positive for red discoloration 'large amount of blood', trace of protein, moderate (2+) leukocytes, but negative for nitrites.             Assessment and Plan    Diagnoses and all orders for this visit:    1. Urinary frequency (Primary)  -     POCT urinalysis dipstick, manual  -     Urine Culture - Urine, Urine, Clean Catch    2. Burning with urination  -     POCT urinalysis dipstick, manual  -     Urine Culture - Urine, Urine, Clean Catch    3. Lower abdominal pain  -     POCT urinalysis dipstick, manual  -     Urine Culture - Urine, Urine, Clean Catch    4. Acute cystitis without hematuria  -     sulfamethoxazole-trimethoprim (Bactrim DS) 800-160 MG per tablet; Take 1 tablet by mouth 2 (Two) Times a Day.  Dispense: 14 tablet; Refill: 0        Assessment & Plan  1. Urinary Tract Infection (UTI).  - Symptoms include burning with urination, frequency, " and lower abdominal pain for the past 2 days.   - Urinalysis shows a large amount of blood, trace protein, and moderate leukocytes, but no nitrites. The patient is currently on Azo, which makes the urinalysis hard to read. A urine culture will be sent for confirmation.  - A prescription for Bactrim will be sent to pharmacy for a full week. If symptoms completely resolve, the course may be shortened. If the culture results indicate a need to change antibiotics, the patient will be contacted directly.    2. Adnexal Tenderness.  - Tenderness over the adnexa raises concerns about potential issues with the ovaries or reproductive tract.  - Physical exam findings include tenderness over the adnexa, but she denies vaginal discharge or pain with intercourse.  - The patient is advised to monitor for symptoms such as fevers, vaginal discharge, or pain with intercourse, which would warrant further investigation. Information about bacterial vaginosis will be provided, a condition for which she was treated last year.  - If symptoms do not improve, a recheck for bacterial vaginosis other causes of pelvic pain will be necessary.    3. Medication Management.  - The patient is currently on a low dose of duloxetine for depression and uses acyclovir for cold sores as needed.  - The patient has been on antidepressants since age 15 and has tried different ones.  - The patient is advised to continue current medications and use allergy medicines as needed during allergy seasons.  - Psychiatric medications not currently taken will be removed from the list to avoid potential future confusion.    4. Health Maintenance.  - The patient is due for an annual exam with Dr. Hernandes,  she is encouraged to schedule the annual exam.  - The patient received the last Depo-Provera shot on 06/19/2025 through a physician at [facility], previously prescribed by Dr. Collins. She will continue Depo-Provera.        Medications Discontinued During  This Encounter   Medication Reason    sertraline (ZOLOFT) 50 MG tablet Alternate therapy    Auvelity  MG tablet controlled-release Alternate therapy         Follow Up     Return if symptoms worsen or fail to improve also encouraged to schedule annual exam with Dr. Hernandes.    Patient was given instructions and counseling regarding her condition or for health maintenance advice. Please see specific information pulled into the AVS if appropriate.     Patient or patient representative verbalized consent for the use of Ambient Listening during the visit with  Lisa Spaulding MD for chart documentation. 7/1/2025  13:13 EDT

## 2025-07-04 LAB
BACTERIA UR CULT: ABNORMAL
BACTERIA UR CULT: ABNORMAL
OTHER ANTIBIOTIC SUSC ISLT: ABNORMAL